# Patient Record
Sex: FEMALE | URBAN - NONMETROPOLITAN AREA
[De-identification: names, ages, dates, MRNs, and addresses within clinical notes are randomized per-mention and may not be internally consistent; named-entity substitution may affect disease eponyms.]

---

## 2017-11-09 ENCOUNTER — TELEPHONE (OUTPATIENT)
Dept: GASTROENTEROLOGY | Age: 27
End: 2017-11-09

## 2017-11-09 NOTE — TELEPHONE ENCOUNTER
This patient will be a NP for JOSE morataya on 11-21-17 @ 10:10.  called the office today states she will be a NP on 11-21-17, she is continuing to have emesis and abdominal pain and diarrhea and was asking to be worked in sooner with Jessica Blue, I checked with Stuart Coffey at  and that was the first available with Jessica Blue. I spoke with [facility] and she does have an opening on the 15th and said she would see the patient if she wants and she said yes. Patient is now scheduled with [facility] on 11-15-17 @ 8:30 am, I told the patient to arrive 15 minutes early, bring current insurance information and a current list of all medications, she voiced understanding. The appt. With JOSE morataya has been [place] on 11-21-17.  Efra monroe

## 2017-11-15 ENCOUNTER — OFFICE VISIT (OUTPATIENT)
Dept: GASTROENTEROLOGY | Age: 27
End: 2017-11-15
Payer: COMMERCIAL

## 2017-11-15 VITALS
SYSTOLIC BLOOD PRESSURE: 115 MMHG | OXYGEN SATURATION: 98 % | HEIGHT: 63 IN | BODY MASS INDEX: 34.41 KG/M2 | WEIGHT: 194.2 LBS | HEART RATE: 79 BPM | DIASTOLIC BLOOD PRESSURE: 70 MMHG

## 2017-11-15 DIAGNOSIS — K21.9 GASTROESOPHAGEAL REFLUX DISEASE, ESOPHAGITIS PRESENCE NOT SPECIFIED: Primary | ICD-10-CM

## 2017-11-15 DIAGNOSIS — R10.84 ABDOMINAL PAIN, GENERALIZED: ICD-10-CM

## 2017-11-15 DIAGNOSIS — R11.2 NAUSEA AND VOMITING, INTRACTABILITY OF VOMITING NOT SPECIFIED, UNSPECIFIED VOMITING TYPE: ICD-10-CM

## 2017-11-15 DIAGNOSIS — R19.4 CHANGE IN BOWEL HABITS: ICD-10-CM

## 2017-11-15 PROCEDURE — 99203 OFFICE O/P NEW LOW 30 MIN: CPT | Performed by: NURSE PRACTITIONER

## 2017-11-15 RX ORDER — VORTIOXETINE 20 MG/1
TABLET, FILM COATED ORAL
Refills: 12 | COMMUNITY
Start: 2017-10-31

## 2017-11-15 RX ORDER — NORGESTIMATE AND ETHINYL ESTRADIOL 0.25-0.035
KIT ORAL
Refills: 11 | COMMUNITY
Start: 2017-10-28

## 2017-11-15 RX ORDER — PANTOPRAZOLE SODIUM 40 MG/1
40 TABLET, DELAYED RELEASE ORAL DAILY
Qty: 30 TABLET | Refills: 3 | Status: SHIPPED | OUTPATIENT
Start: 2017-11-15

## 2017-11-15 RX ORDER — ONDANSETRON 8 MG/1
TABLET, ORALLY DISINTEGRATING ORAL
Qty: 30 TABLET | Refills: 2 | Status: SHIPPED | OUTPATIENT
Start: 2017-11-15

## 2017-11-15 RX ORDER — ONDANSETRON 8 MG/1
TABLET, ORALLY DISINTEGRATING ORAL
Refills: 0 | COMMUNITY
Start: 2017-11-03 | End: 2017-11-15 | Stop reason: SDUPTHER

## 2017-11-15 ASSESSMENT — ENCOUNTER SYMPTOMS
BLOOD IN STOOL: 0
ABDOMINAL DISTENTION: 0
BACK PAIN: 0
DIARRHEA: 1
ABDOMINAL PAIN: 1
RECTAL PAIN: 0
SORE THROAT: 0
VOICE CHANGE: 0
TROUBLE SWALLOWING: 0
COUGH: 0
NAUSEA: 1
CONSTIPATION: 1
VOMITING: 1
SINUS PRESSURE: 1
SHORTNESS OF BREATH: 0
ANAL BLEEDING: 0

## 2017-11-15 NOTE — PROGRESS NOTES
education:  Risks, benefits, and alternatives to colonoscopy were discussed. Risks of colonoscopy include, but are not limited to, perforation, bleeding, and infection. We discussed that the risk for perforation is 1-3 in 5,000  at the time of colonoscopy;   and 1-2% risk of bleeding post-polypectomy. All questions answered to the satisfaction of the patient. Pt is agreeable to proceed. 3. Protonix daily #30 with 3 refills  4. OK to continue zofran, I refilled what her PCP prescribed (Zofran 8mg ODT) prn nausea, #30 with 2 refills.  She is aware this can worsen her constipation, if this occurs use Miralax

## 2017-11-20 ENCOUNTER — TELEPHONE (OUTPATIENT)
Dept: GASTROENTEROLOGY | Age: 27
End: 2017-11-20

## 2017-11-20 NOTE — TELEPHONE ENCOUNTER
patient has not met deductible and will have a $5000 out of pocket, she wants to cancel, I have sent her financial assistance paperwork that she is going to send to [facility] for possible assistance. She said that her symptoms have somewhat improved.

## 2017-11-21 NOTE — TELEPHONE ENCOUNTER
Called Oanh Hawthorne's office - talked to Zakia at the  (the phone nurse was out) gave her the information

## 2021-04-18 ENCOUNTER — HOSPITAL ENCOUNTER (EMERGENCY)
Facility: HOSPITAL | Age: 31
Discharge: HOME | End: 2021-04-18
Admitting: FAMILY MEDICINE

## 2021-04-18 ENCOUNTER — APPOINTMENT (OUTPATIENT)
Dept: ULTRASOUND IMAGING | Facility: HOSPITAL | Age: 31
End: 2021-04-18

## 2021-04-18 VITALS
TEMPERATURE: 98.5 F | OXYGEN SATURATION: 97 % | SYSTOLIC BLOOD PRESSURE: 118 MMHG | WEIGHT: 210 LBS | HEART RATE: 111 BPM | DIASTOLIC BLOOD PRESSURE: 69 MMHG | RESPIRATION RATE: 16 BRPM | BODY MASS INDEX: 38.64 KG/M2 | HEIGHT: 62 IN

## 2021-04-18 DIAGNOSIS — L03.116 CELLULITIS OF LEFT LOWER EXTREMITY: Primary | ICD-10-CM

## 2021-04-18 LAB
ALBUMIN SERPL-MCNC: 4 G/DL (ref 3.5–5.2)
ALBUMIN/GLOB SERPL: 1.2 G/DL
ALP SERPL-CCNC: 81 U/L (ref 39–117)
ALT SERPL W P-5'-P-CCNC: 14 U/L (ref 1–33)
ANION GAP SERPL CALCULATED.3IONS-SCNC: 8 MMOL/L (ref 5–15)
APTT PPP: 29.9 SECONDS (ref 24.1–35)
AST SERPL-CCNC: 16 U/L (ref 1–32)
BASOPHILS # BLD AUTO: 0.04 10*3/MM3 (ref 0–0.2)
BASOPHILS NFR BLD AUTO: 0.2 % (ref 0–1.5)
BILIRUB SERPL-MCNC: 0.4 MG/DL (ref 0–1.2)
BUN SERPL-MCNC: 13 MG/DL (ref 6–20)
BUN/CREAT SERPL: 20.6 (ref 7–25)
CALCIUM SPEC-SCNC: 8.7 MG/DL (ref 8.6–10.5)
CHLORIDE SERPL-SCNC: 103 MMOL/L (ref 98–107)
CO2 SERPL-SCNC: 26 MMOL/L (ref 22–29)
CREAT SERPL-MCNC: 0.63 MG/DL (ref 0.57–1)
DEPRECATED RDW RBC AUTO: 39.4 FL (ref 37–54)
EOSINOPHIL # BLD AUTO: 0 10*3/MM3 (ref 0–0.4)
EOSINOPHIL NFR BLD AUTO: 0 % (ref 0.3–6.2)
ERYTHROCYTE [DISTWIDTH] IN BLOOD BY AUTOMATED COUNT: 13 % (ref 12.3–15.4)
GFR SERPL CREATININE-BSD FRML MDRD: 113 ML/MIN/1.73
GLOBULIN UR ELPH-MCNC: 3.4 GM/DL
GLUCOSE SERPL-MCNC: 135 MG/DL (ref 65–99)
HCT VFR BLD AUTO: 35.5 % (ref 34–46.6)
HGB BLD-MCNC: 11.8 G/DL (ref 12–15.9)
IMM GRANULOCYTES # BLD AUTO: 0.1 10*3/MM3 (ref 0–0.05)
IMM GRANULOCYTES NFR BLD AUTO: 0.5 % (ref 0–0.5)
INR PPP: 1.22 (ref 0.91–1.09)
LYMPHOCYTES # BLD AUTO: 1.42 10*3/MM3 (ref 0.7–3.1)
LYMPHOCYTES NFR BLD AUTO: 7.4 % (ref 19.6–45.3)
MCH RBC QN AUTO: 27.4 PG (ref 26.6–33)
MCHC RBC AUTO-ENTMCNC: 33.2 G/DL (ref 31.5–35.7)
MCV RBC AUTO: 82.4 FL (ref 79–97)
MONOCYTES # BLD AUTO: 0.5 10*3/MM3 (ref 0.1–0.9)
MONOCYTES NFR BLD AUTO: 2.6 % (ref 5–12)
NEUTROPHILS NFR BLD AUTO: 17.21 10*3/MM3 (ref 1.7–7)
NEUTROPHILS NFR BLD AUTO: 89.3 % (ref 42.7–76)
NRBC BLD AUTO-RTO: 0 /100 WBC (ref 0–0.2)
PLATELET # BLD AUTO: 282 10*3/MM3 (ref 140–450)
PMV BLD AUTO: 8.9 FL (ref 6–12)
POTASSIUM SERPL-SCNC: 3.7 MMOL/L (ref 3.5–5.2)
PROT SERPL-MCNC: 7.4 G/DL (ref 6–8.5)
PROTHROMBIN TIME: 15 SECONDS (ref 11.9–14.6)
RBC # BLD AUTO: 4.31 10*6/MM3 (ref 3.77–5.28)
SODIUM SERPL-SCNC: 137 MMOL/L (ref 136–145)
WBC # BLD AUTO: 19.27 10*3/MM3 (ref 3.4–10.8)

## 2021-04-18 PROCEDURE — 93971 EXTREMITY STUDY: CPT

## 2021-04-18 PROCEDURE — 36415 COLL VENOUS BLD VENIPUNCTURE: CPT

## 2021-04-18 PROCEDURE — 87040 BLOOD CULTURE FOR BACTERIA: CPT | Performed by: NURSE PRACTITIONER

## 2021-04-18 PROCEDURE — 85025 COMPLETE CBC W/AUTO DIFF WBC: CPT | Performed by: NURSE PRACTITIONER

## 2021-04-18 PROCEDURE — 85610 PROTHROMBIN TIME: CPT | Performed by: NURSE PRACTITIONER

## 2021-04-18 PROCEDURE — 96365 THER/PROPH/DIAG IV INF INIT: CPT

## 2021-04-18 PROCEDURE — 80053 COMPREHEN METABOLIC PANEL: CPT | Performed by: NURSE PRACTITIONER

## 2021-04-18 PROCEDURE — 93971 EXTREMITY STUDY: CPT | Performed by: SURGERY

## 2021-04-18 PROCEDURE — 85730 THROMBOPLASTIN TIME PARTIAL: CPT | Performed by: NURSE PRACTITIONER

## 2021-04-18 PROCEDURE — 99282 EMERGENCY DEPT VISIT SF MDM: CPT

## 2021-04-18 RX ORDER — ERGOCALCIFEROL 1.25 MG/1
50000 CAPSULE ORAL 2 TIMES WEEKLY
COMMUNITY

## 2021-04-18 RX ORDER — CLINDAMYCIN PHOSPHATE 900 MG/50ML
900 INJECTION INTRAVENOUS ONCE
Status: COMPLETED | OUTPATIENT
Start: 2021-04-18 | End: 2021-04-18

## 2021-04-18 RX ORDER — CLINDAMYCIN HYDROCHLORIDE 300 MG/1
300 CAPSULE ORAL 4 TIMES DAILY
Qty: 40 CAPSULE | Refills: 0 | Status: SHIPPED | OUTPATIENT
Start: 2021-04-18 | End: 2021-04-18 | Stop reason: SDUPTHER

## 2021-04-18 RX ORDER — SODIUM CHLORIDE 0.9 % (FLUSH) 0.9 %
10 SYRINGE (ML) INJECTION AS NEEDED
Status: DISCONTINUED | OUTPATIENT
Start: 2021-04-18 | End: 2021-04-18 | Stop reason: HOSPADM

## 2021-04-18 RX ORDER — METHYLPHENIDATE HYDROCHLORIDE 36 MG/1
36 TABLET ORAL WEEKLY
COMMUNITY

## 2021-04-18 RX ORDER — SULFAMETHOXAZOLE AND TRIMETHOPRIM 800; 160 MG/1; MG/1
1 TABLET ORAL 2 TIMES DAILY
Qty: 20 TABLET | Refills: 0 | Status: SHIPPED | OUTPATIENT
Start: 2021-04-18 | End: 2021-04-28

## 2021-04-18 RX ORDER — VORTIOXETINE 20 MG/1
TABLET, FILM COATED ORAL DAILY
COMMUNITY

## 2021-04-18 RX ADMIN — CLINDAMYCIN IN 5 PERCENT DEXTROSE 900 MG: 18 INJECTION, SOLUTION INTRAVENOUS at 12:53

## 2021-04-18 NOTE — ED PROVIDER NOTES
Subjective   Patient is a 30-year-old female presents emergency department with chief complaints of left lower extremity pain and a rash.  Patient states a few months ago she developed a DVT to the left lower extremity.  She was prescribed Eliquis and approximately a month ago she was instructed that she may discontinue this medication.  She states a few days ago she began developing left groin pain and then yesterday noted a rash to the left calf region.  She is concerned that she may have developed another DVT and came to the ER for evaluation and treatment.  She states that she was recently on a trip where she was sedentary for quite some time.  She was evaluated at another local facility who did an ultrasound and it was negative for DVT and felt she had pulled a muscle to her left groin.  She states however with the new onset of rash she wanted this to be reevaluated.  Again patient is currently not anticoagulated as she was instructed to discontinue the Eliquis.          Review of Systems   Constitutional: Negative.  Negative for fever.   HENT: Negative.  Negative for congestion.    Eyes: Negative.    Respiratory: Negative.  Negative for cough and shortness of breath.    Cardiovascular: Negative.  Negative for chest pain.   Gastrointestinal: Negative.  Negative for abdominal pain, diarrhea, nausea and vomiting.   Genitourinary: Negative.    Musculoskeletal:        Positive for left calf discomfort   Skin: Positive for rash.   All other systems reviewed and are negative.      Past Medical History:   Diagnosis Date   • DVT (deep venous thrombosis) (CMS/HCC)    • Melanoma (CMS/HCC)     Skin CA of left leg (melanoma)       No Known Allergies    Past Surgical History:   Procedure Laterality Date   • APPENDECTOMY     • EXPLORATORY LAPAROTOMY  2019    Exploratory Lap   • FEMORAL LYMPH NODE BIOPSY/EXCISON Left 2017    lymph nodes removed from the left groin   • TONSILLECTOMY     • WISDOM TOOTH EXTRACTION          History reviewed. No pertinent family history.    Social History     Socioeconomic History   • Marital status:      Spouse name: Not on file   • Number of children: Not on file   • Years of education: Not on file   • Highest education level: Not on file   Tobacco Use   • Smoking status: Current Every Day Smoker     Types: Cigarettes   • Smokeless tobacco: Never Used   • Tobacco comment: 3-4 cigarettes a day   Substance and Sexual Activity   • Alcohol use: Yes     Alcohol/week: 5.0 standard drinks     Types: 5 Cans of beer per week   • Drug use: Yes     Frequency: 5.0 times per week     Types: Marijuana   • Sexual activity: Defer           Objective   Physical Exam  Vitals and nursing note reviewed.   Constitutional:       Appearance: She is well-developed.   HENT:      Head: Normocephalic and atraumatic.      Right Ear: External ear normal.      Left Ear: External ear normal.      Nose: Nose normal.      Mouth/Throat:      Mouth: Mucous membranes are moist.      Pharynx: Oropharynx is clear.   Eyes:      Extraocular Movements: Extraocular movements intact.      Conjunctiva/sclera: Conjunctivae normal.      Pupils: Pupils are equal, round, and reactive to light.   Cardiovascular:      Rate and Rhythm: Normal rate and regular rhythm.      Heart sounds: Normal heart sounds.   Pulmonary:      Effort: Pulmonary effort is normal.      Breath sounds: Normal breath sounds.   Abdominal:      General: Bowel sounds are normal.      Palpations: Abdomen is soft.   Musculoskeletal:         General: Normal range of motion.      Cervical back: Normal range of motion and neck supple.   Skin:     General: Skin is warm and dry.      Capillary Refill: Capillary refill takes less than 2 seconds.      Findings: Erythema present.      Comments: Warmth and erythema noted to the left calf just below the left knee and nearly circumferential to the left ankle    Neurological:      General: No focal deficit present.      Mental  Status: She is alert and oriented to person, place, and time.   Psychiatric:         Mood and Affect: Mood normal.         Behavior: Behavior normal.         Thought Content: Thought content normal.         Judgment: Judgment normal.         Procedures           ED Course  ED Course as of Apr 18 1643   Sun Apr 18, 2021   1204 Patient's white count is elevated at 19.27, CMP is unremarkable.  Venous duplex Doppler ultrasound is negative for DVT per tech report.  We have ordered a dose of IV clindamycin and we will reassess.    [TW]   1357 On reexam patient has some improvements.  She does have a fairly significant white count of 19.27.  Clear cellulitis also is just below the knee down to her left ankle.  We discussed possible admission however patient would like to attempt to go home and see how she does with p.o. antibiotics.  Recommend to monitor closely ascertaining that there are no worsening symptoms including high fevers, vomiting, or worsening cellulitis.  Patient expressed understanding.    [TW]      ED Course User Index  [TW] Lizzie Rolon, APRN                                           MDM  Number of Diagnoses or Management Options  Cellulitis of left lower extremity: new and requires workup     Amount and/or Complexity of Data Reviewed  Clinical lab tests: ordered and reviewed  Tests in the radiology section of CPT®: ordered and reviewed  Discuss the patient with other providers: yes    Risk of Complications, Morbidity, and/or Mortality  Presenting problems: moderate  Diagnostic procedures: moderate  Management options: moderate    Patient Progress  Patient progress: improved      Final diagnoses:   Cellulitis of left lower extremity       ED Disposition  ED Disposition     ED Disposition Condition Comment    Discharge Good           No follow-up provider specified.       Medication List      New Prescriptions    sulfamethoxazole-trimethoprim 800-160 MG per tablet  Commonly known as: BACTRIM  DS,SEPTRA DS  Take 1 tablet by mouth 2 (Two) Times a Day for 10 days.           Where to Get Your Medications      These medications were sent to [id number] - [address] [facility] [address] - [phone]  - [phone] [address] [address]    Phone: [phone]   · sulfamethoxazole-trimethoprim 800-160 MG per tablet          Lizzie Rolon, APRN  04/18/21 [id number]

## 2021-04-23 LAB
BACTERIA SPEC AEROBE CULT: NORMAL
BACTERIA SPEC AEROBE CULT: NORMAL

## 2023-09-18 NOTE — DISCHARGE INSTRUCTIONS
You may begin bactrim today (you received a loading dose of clindamycin in the emergency dept)    Close follow up with pcp   Return with worsening sxs such as vomiting or worsening cellulitis    Taltz Counseling: I discussed with the patient the risks of ixekizumab including but not limited to immunosuppression, serious infections, worsening of inflammatory bowel disease and drug reactions.  The patient understands that monitoring is required including a PPD at baseline and must alert us or the primary physician if symptoms of infection or other concerning signs are noted.

## 2024-01-07 ENCOUNTER — APPOINTMENT (OUTPATIENT)
Dept: CT IMAGING | Facility: HOSPITAL | Age: 34
End: 2024-01-07
Payer: COMMERCIAL

## 2024-01-07 ENCOUNTER — HOSPITAL ENCOUNTER (INPATIENT)
Facility: HOSPITAL | Age: 34
LOS: 3 days | Discharge: HOME | End: 2024-01-10
Attending: EMERGENCY MEDICINE | Admitting: HOSPITALIST
Payer: COMMERCIAL

## 2024-01-07 ENCOUNTER — APPOINTMENT (OUTPATIENT)
Dept: ULTRASOUND IMAGING | Facility: HOSPITAL | Age: 34
End: 2024-01-07
Payer: COMMERCIAL

## 2024-01-07 DIAGNOSIS — L03.116 CELLULITIS OF LEFT LOWER EXTREMITY: Primary | ICD-10-CM

## 2024-01-07 DIAGNOSIS — R50.9 FEVER, UNSPECIFIED FEVER CAUSE: ICD-10-CM

## 2024-01-07 DIAGNOSIS — R00.0 TACHYCARDIA: ICD-10-CM

## 2024-01-07 PROBLEM — L03.90 CELLULITIS: Status: ACTIVE | Noted: 2024-01-07

## 2024-01-07 LAB
ALBUMIN SERPL-MCNC: 4.6 G/DL (ref 3.5–5.2)
ALBUMIN/GLOB SERPL: 1.5 G/DL
ALP SERPL-CCNC: 95 U/L (ref 39–117)
ALT SERPL W P-5'-P-CCNC: 19 U/L (ref 1–33)
AMPHET+METHAMPHET UR QL: NEGATIVE
AMPHETAMINES UR QL: NEGATIVE
ANION GAP SERPL CALCULATED.3IONS-SCNC: 15 MMOL/L (ref 5–15)
AST SERPL-CCNC: 13 U/L (ref 1–32)
BACTERIA UR QL AUTO: ABNORMAL /HPF
BARBITURATES UR QL SCN: NEGATIVE
BASOPHILS # BLD AUTO: 0.03 10*3/MM3 (ref 0–0.2)
BASOPHILS NFR BLD AUTO: 0.1 % (ref 0–1.5)
BENZODIAZ UR QL SCN: NEGATIVE
BILIRUB SERPL-MCNC: 0.5 MG/DL (ref 0–1.2)
BILIRUB UR QL STRIP: NEGATIVE
BUN SERPL-MCNC: 17 MG/DL (ref 6–20)
BUN/CREAT SERPL: 18.5 (ref 7–25)
BUPRENORPHINE SERPL-MCNC: NEGATIVE NG/ML
CALCIUM SPEC-SCNC: 8.8 MG/DL (ref 8.6–10.5)
CANNABINOIDS SERPL QL: NEGATIVE
CHLORIDE SERPL-SCNC: 100 MMOL/L (ref 98–107)
CLARITY UR: CLEAR
CO2 SERPL-SCNC: 21 MMOL/L (ref 22–29)
COCAINE UR QL: NEGATIVE
COLOR UR: YELLOW
CREAT SERPL-MCNC: 0.92 MG/DL (ref 0.57–1)
CRP SERPL-MCNC: 12.27 MG/DL (ref 0–0.5)
D-LACTATE SERPL-SCNC: 1.3 MMOL/L (ref 0.5–2)
DEPRECATED RDW RBC AUTO: 39.1 FL (ref 37–54)
EGFRCR SERPLBLD CKD-EPI 2021: 85.5 ML/MIN/1.73
EOSINOPHIL # BLD AUTO: 0.01 10*3/MM3 (ref 0–0.4)
EOSINOPHIL NFR BLD AUTO: 0 % (ref 0.3–6.2)
ERYTHROCYTE [DISTWIDTH] IN BLOOD BY AUTOMATED COUNT: 13.3 % (ref 12.3–15.4)
FENTANYL UR-MCNC: NEGATIVE NG/ML
FLUAV RNA RESP QL NAA+PROBE: NOT DETECTED
FLUBV RNA RESP QL NAA+PROBE: NOT DETECTED
GLOBULIN UR ELPH-MCNC: 3 GM/DL
GLUCOSE SERPL-MCNC: 151 MG/DL (ref 65–99)
GLUCOSE UR STRIP-MCNC: NEGATIVE MG/DL
HCG SERPL QL: NEGATIVE
HCT VFR BLD AUTO: 37.1 % (ref 34–46.6)
HGB BLD-MCNC: 12.3 G/DL (ref 12–15.9)
HGB UR QL STRIP.AUTO: NEGATIVE
HYALINE CASTS UR QL AUTO: ABNORMAL /LPF
IMM GRANULOCYTES # BLD AUTO: 0.08 10*3/MM3 (ref 0–0.05)
IMM GRANULOCYTES NFR BLD AUTO: 0.4 % (ref 0–0.5)
KETONES UR QL STRIP: ABNORMAL
LEUKOCYTE ESTERASE UR QL STRIP.AUTO: NEGATIVE
LYMPHOCYTES # BLD AUTO: 0.83 10*3/MM3 (ref 0.7–3.1)
LYMPHOCYTES NFR BLD AUTO: 4.1 % (ref 19.6–45.3)
MAGNESIUM SERPL-MCNC: 1.5 MG/DL (ref 1.6–2.6)
MCH RBC QN AUTO: 26.9 PG (ref 26.6–33)
MCHC RBC AUTO-ENTMCNC: 33.2 G/DL (ref 31.5–35.7)
MCV RBC AUTO: 81.2 FL (ref 79–97)
METHADONE UR QL SCN: NEGATIVE
MONOCYTES # BLD AUTO: 0.7 10*3/MM3 (ref 0.1–0.9)
MONOCYTES NFR BLD AUTO: 3.5 % (ref 5–12)
MRSA DNA SPEC QL NAA+PROBE: NORMAL
NEUTROPHILS NFR BLD AUTO: 18.47 10*3/MM3 (ref 1.7–7)
NEUTROPHILS NFR BLD AUTO: 91.9 % (ref 42.7–76)
NITRITE UR QL STRIP: NEGATIVE
NRBC BLD AUTO-RTO: 0 /100 WBC (ref 0–0.2)
OPIATES UR QL: NEGATIVE
OXYCODONE UR QL SCN: NEGATIVE
PCP UR QL SCN: NEGATIVE
PH UR STRIP.AUTO: 6 [PH] (ref 5–8)
PLATELET # BLD AUTO: 290 10*3/MM3 (ref 140–450)
PMV BLD AUTO: 8.9 FL (ref 6–12)
POTASSIUM SERPL-SCNC: 3.1 MMOL/L (ref 3.5–5.2)
PROCALCITONIN SERPL-MCNC: 1.62 NG/ML (ref 0–0.25)
PROT SERPL-MCNC: 7.6 G/DL (ref 6–8.5)
PROT UR QL STRIP: ABNORMAL
RBC # BLD AUTO: 4.57 10*6/MM3 (ref 3.77–5.28)
RBC # UR STRIP: ABNORMAL /HPF
REF LAB TEST METHOD: ABNORMAL
RSV RNA NPH QL NAA+NON-PROBE: NOT DETECTED
SARS-COV-2 RNA RESP QL NAA+PROBE: NOT DETECTED
SODIUM SERPL-SCNC: 136 MMOL/L (ref 136–145)
SP GR UR STRIP: >1.03 (ref 1–1.03)
SQUAMOUS #/AREA URNS HPF: ABNORMAL /HPF
TRICYCLICS UR QL SCN: NEGATIVE
UROBILINOGEN UR QL STRIP: ABNORMAL
WBC # UR STRIP: ABNORMAL /HPF
WBC NRBC COR # BLD AUTO: 20.12 10*3/MM3 (ref 3.4–10.8)

## 2024-01-07 PROCEDURE — 93970 EXTREMITY STUDY: CPT

## 2024-01-07 PROCEDURE — 80307 DRUG TEST PRSMV CHEM ANLYZR: CPT | Performed by: FAMILY MEDICINE

## 2024-01-07 PROCEDURE — 81001 URINALYSIS AUTO W/SCOPE: CPT

## 2024-01-07 PROCEDURE — 93970 EXTREMITY STUDY: CPT | Performed by: SURGERY

## 2024-01-07 PROCEDURE — 80053 COMPREHEN METABOLIC PANEL: CPT

## 2024-01-07 PROCEDURE — 87637 SARSCOV2&INF A&B&RSV AMP PRB: CPT

## 2024-01-07 PROCEDURE — 25810000003 LACTATED RINGERS PER 1000 ML: Performed by: FAMILY MEDICINE

## 2024-01-07 PROCEDURE — 87040 BLOOD CULTURE FOR BACTERIA: CPT

## 2024-01-07 PROCEDURE — 25010000002 PIPERACILLIN SOD-TAZOBACTAM PER 1 G

## 2024-01-07 PROCEDURE — 25510000001 IOPAMIDOL PER 1 ML

## 2024-01-07 PROCEDURE — 25010000002 ENOXAPARIN PER 10 MG: Performed by: FAMILY MEDICINE

## 2024-01-07 PROCEDURE — 86140 C-REACTIVE PROTEIN: CPT

## 2024-01-07 PROCEDURE — 71275 CT ANGIOGRAPHY CHEST: CPT

## 2024-01-07 PROCEDURE — 83735 ASSAY OF MAGNESIUM: CPT

## 2024-01-07 PROCEDURE — 83605 ASSAY OF LACTIC ACID: CPT

## 2024-01-07 PROCEDURE — 84145 PROCALCITONIN (PCT): CPT

## 2024-01-07 PROCEDURE — 93010 ELECTROCARDIOGRAM REPORT: CPT | Performed by: INTERNAL MEDICINE

## 2024-01-07 PROCEDURE — 25010000002 MAGNESIUM SULFATE 2 GM/50ML SOLUTION

## 2024-01-07 PROCEDURE — 85025 COMPLETE CBC W/AUTO DIFF WBC: CPT

## 2024-01-07 PROCEDURE — 25810000003 SODIUM CHLORIDE 0.9 % SOLUTION: Performed by: FAMILY MEDICINE

## 2024-01-07 PROCEDURE — 36415 COLL VENOUS BLD VENIPUNCTURE: CPT

## 2024-01-07 PROCEDURE — 99285 EMERGENCY DEPT VISIT HI MDM: CPT

## 2024-01-07 PROCEDURE — 25810000003 SEPSIS FLUID NS 0.9 % SOLUTION

## 2024-01-07 PROCEDURE — 87641 MR-STAPH DNA AMP PROBE: CPT | Performed by: FAMILY MEDICINE

## 2024-01-07 PROCEDURE — 25010000002 VANCOMYCIN 10 G RECONSTITUTED SOLUTION: Performed by: FAMILY MEDICINE

## 2024-01-07 PROCEDURE — 25010000002 POTASSIUM CHLORIDE 10 MEQ/100ML SOLUTION

## 2024-01-07 PROCEDURE — 84703 CHORIONIC GONADOTROPIN ASSAY: CPT

## 2024-01-07 PROCEDURE — 25010000002 PIPERACILLIN SOD-TAZOBACTAM PER 1 G: Performed by: FAMILY MEDICINE

## 2024-01-07 RX ORDER — ACETAMINOPHEN 325 MG/1
650 TABLET ORAL EVERY 6 HOURS PRN
Status: DISCONTINUED | OUTPATIENT
Start: 2024-01-07 | End: 2024-01-10 | Stop reason: HOSPADM

## 2024-01-07 RX ORDER — ONDANSETRON 2 MG/ML
4 INJECTION INTRAMUSCULAR; INTRAVENOUS EVERY 6 HOURS PRN
Status: DISCONTINUED | OUTPATIENT
Start: 2024-01-07 | End: 2024-01-10 | Stop reason: HOSPADM

## 2024-01-07 RX ORDER — SODIUM CHLORIDE 0.9 % (FLUSH) 0.9 %
10 SYRINGE (ML) INJECTION AS NEEDED
Status: DISCONTINUED | OUTPATIENT
Start: 2024-01-07 | End: 2024-01-10 | Stop reason: HOSPADM

## 2024-01-07 RX ORDER — ENOXAPARIN SODIUM 100 MG/ML
40 INJECTION SUBCUTANEOUS EVERY 24 HOURS
Status: DISCONTINUED | OUTPATIENT
Start: 2024-01-07 | End: 2024-01-10 | Stop reason: HOSPADM

## 2024-01-07 RX ORDER — SODIUM CHLORIDE 0.9 % (FLUSH) 0.9 %
10 SYRINGE (ML) INJECTION EVERY 12 HOURS SCHEDULED
Status: DISCONTINUED | OUTPATIENT
Start: 2024-01-07 | End: 2024-01-10 | Stop reason: HOSPADM

## 2024-01-07 RX ORDER — SODIUM CHLORIDE 9 MG/ML
40 INJECTION, SOLUTION INTRAVENOUS AS NEEDED
Status: DISCONTINUED | OUTPATIENT
Start: 2024-01-07 | End: 2024-01-10 | Stop reason: HOSPADM

## 2024-01-07 RX ORDER — POLYETHYLENE GLYCOL 3350 17 G/17G
17 POWDER, FOR SOLUTION ORAL DAILY PRN
Status: DISCONTINUED | OUTPATIENT
Start: 2024-01-07 | End: 2024-01-10 | Stop reason: HOSPADM

## 2024-01-07 RX ORDER — BISACODYL 10 MG
10 SUPPOSITORY, RECTAL RECTAL DAILY PRN
Status: DISCONTINUED | OUTPATIENT
Start: 2024-01-07 | End: 2024-01-10 | Stop reason: HOSPADM

## 2024-01-07 RX ORDER — HYDROCODONE BITARTRATE AND ACETAMINOPHEN 5; 325 MG/1; MG/1
1 TABLET ORAL EVERY 6 HOURS PRN
Status: DISCONTINUED | OUTPATIENT
Start: 2024-01-07 | End: 2024-01-10 | Stop reason: HOSPADM

## 2024-01-07 RX ORDER — IBUPROFEN 800 MG/1
400 TABLET ORAL EVERY 6 HOURS PRN
Status: DISCONTINUED | OUTPATIENT
Start: 2024-01-07 | End: 2024-01-10 | Stop reason: HOSPADM

## 2024-01-07 RX ORDER — POTASSIUM CHLORIDE 7.45 MG/ML
10 INJECTION INTRAVENOUS ONCE
Status: DISCONTINUED | OUTPATIENT
Start: 2024-01-07 | End: 2024-01-07

## 2024-01-07 RX ORDER — BISACODYL 5 MG/1
5 TABLET, DELAYED RELEASE ORAL DAILY PRN
Status: DISCONTINUED | OUTPATIENT
Start: 2024-01-07 | End: 2024-01-10 | Stop reason: HOSPADM

## 2024-01-07 RX ORDER — MAGNESIUM SULFATE HEPTAHYDRATE 40 MG/ML
2 INJECTION, SOLUTION INTRAVENOUS ONCE
Status: COMPLETED | OUTPATIENT
Start: 2024-01-07 | End: 2024-01-07

## 2024-01-07 RX ORDER — POTASSIUM CHLORIDE 750 MG/1
40 CAPSULE, EXTENDED RELEASE ORAL 2 TIMES DAILY
Status: COMPLETED | OUTPATIENT
Start: 2024-01-07 | End: 2024-01-07

## 2024-01-07 RX ORDER — SODIUM CHLORIDE, SODIUM LACTATE, POTASSIUM CHLORIDE, CALCIUM CHLORIDE 600; 310; 30; 20 MG/100ML; MG/100ML; MG/100ML; MG/100ML
100 INJECTION, SOLUTION INTRAVENOUS CONTINUOUS
Status: DISCONTINUED | OUTPATIENT
Start: 2024-01-07 | End: 2024-01-08

## 2024-01-07 RX ORDER — AMOXICILLIN 250 MG
2 CAPSULE ORAL 2 TIMES DAILY
Status: DISCONTINUED | OUTPATIENT
Start: 2024-01-07 | End: 2024-01-10 | Stop reason: HOSPADM

## 2024-01-07 RX ADMIN — IBUPROFEN 400 MG: 800 TABLET, FILM COATED ORAL at 18:39

## 2024-01-07 RX ADMIN — IOPAMIDOL 71 ML: 755 INJECTION, SOLUTION INTRAVENOUS at 09:48

## 2024-01-07 RX ADMIN — SODIUM CHLORIDE, POTASSIUM CHLORIDE, SODIUM LACTATE AND CALCIUM CHLORIDE 100 ML/HR: 600; 310; 30; 20 INJECTION, SOLUTION INTRAVENOUS at 12:48

## 2024-01-07 RX ADMIN — PIPERACILLIN SODIUM AND TAZOBACTAM SODIUM 3.38 G: 3; .375 INJECTION, POWDER, LYOPHILIZED, FOR SOLUTION INTRAVENOUS at 16:25

## 2024-01-07 RX ADMIN — HYDROCODONE BITARTRATE AND ACETAMINOPHEN 1 TABLET: 5; 325 TABLET ORAL at 18:45

## 2024-01-07 RX ADMIN — Medication 10 ML: at 12:45

## 2024-01-07 RX ADMIN — PIPERACILLIN AND TAZOBACTAM 4.5 G: 4; .5 INJECTION, POWDER, FOR SOLUTION INTRAVENOUS at 09:17

## 2024-01-07 RX ADMIN — POTASSIUM CHLORIDE 40 MEQ: 10 CAPSULE, COATED, EXTENDED RELEASE ORAL at 21:42

## 2024-01-07 RX ADMIN — POTASSIUM CHLORIDE 40 MEQ: 10 CAPSULE, COATED, EXTENDED RELEASE ORAL at 12:45

## 2024-01-07 RX ADMIN — MAGNESIUM SULFATE HEPTAHYDRATE 2 G: 2 INJECTION, SOLUTION INTRAVENOUS at 11:32

## 2024-01-07 RX ADMIN — SODIUM CHLORIDE 2073 ML: 9 INJECTION, SOLUTION INTRAVENOUS at 09:15

## 2024-01-07 RX ADMIN — HYDROCODONE BITARTRATE AND ACETAMINOPHEN 1 TABLET: 5; 325 TABLET ORAL at 12:40

## 2024-01-07 RX ADMIN — ENOXAPARIN SODIUM 40 MG: 100 INJECTION SUBCUTANEOUS at 12:58

## 2024-01-07 RX ADMIN — ACETAMINOPHEN 650 MG: 325 TABLET, FILM COATED ORAL at 16:22

## 2024-01-07 RX ADMIN — POTASSIUM CHLORIDE 10 MEQ: 10 INJECTION, SOLUTION INTRAVENOUS at 11:24

## 2024-01-07 RX ADMIN — Medication 1750 MG: at 21:42

## 2024-01-07 NOTE — H&P
[facility] Medicine Services  HISTORY AND PHYSICAL    Date of Admission: 1/7/2024  Primary Care Physician: Karen Howard APRN    Subjective   Primary Historian: Patient .    Chief Complaint: Left leg cellulitis.    History of Present Illness  Patient is a 33-year-old presented to the ER complaining of fevers and left legs red and swollen and inflamed.  Patient has a history of DVT and cellulitis in 2021.  Patient gave birth 3 months ago and is currently breast-feeding.  Patient stopped Lovenox about 6 weeks ago.  Patient denies any shortness of breath.    Patient has history of DVT, melatonin.    Review of Systems   Otherwise complete ROS reviewed and negative except as mentioned in the HPI.    Past Medical History:   Past Medical History:   Diagnosis Date    DVT (deep venous thrombosis)     Melanoma     Skin CA of left leg (melanoma)     Past Surgical History:  Past Surgical History:   Procedure Laterality Date    APPENDECTOMY      EXPLORATORY LAPAROTOMY  2019    Exploratory Lap    FEMORAL LYMPH NODE BIOPSY/EXCISON Left 2017    lymph nodes removed from the left groin    TONSILLECTOMY      WISDOM TOOTH EXTRACTION       Social History:  reports that she has been smoking cigarettes. She has never used smokeless tobacco. She reports current alcohol use of about 5.0 standard drinks of alcohol per week. She reports current drug use. Frequency: 5.00 times per week. Drug: Marijuana.    Family History: family history includes No Known Problems in her father and mother.       Allergies:  No Known Allergies    Medications:  Prior to Admission medications    Medication Sig Start Date End Date Taking? Authorizing Provider   methylphenidate 36 MG CR tablet Take 36 mg by mouth 1 (One) Time Per Week Pt takes this as needed each week    Provider, MD Devonte   vitamin D (ERGOCALCIFEROL) 1.25 MG (55689 UT) capsule capsule Take 50,000 Units by mouth 2 (Two) Times a Week.    Provider,  "MD Devonte   Vortioxetine HBr (Trintellix) 20 MG tablet Take  by mouth Daily.    Provider, MD Devonte     I have utilized all available immediate resources to obtain, update, or review the patient's current medications (including all prescriptions, over-the-counter products, herbals, cannabis/cannabidiol products, and vitamin/mineral/dietary (nutritional) supplements).    Objective     Vital Signs: /70   Pulse 101   Temp 99.4 °F (37.4 °C) (Oral)   Resp 20   Ht 157.5 cm (62\")   Wt 97.5 kg (215 lb)   LMP 11/15/2023 (Approximate)   SpO2 100%   Breastfeeding Yes   BMI 39.32 kg/m²   Physical Exam  Vitals and nursing note reviewed.   HENT:      Head: Normocephalic and atraumatic.   Eyes:      Conjunctiva/sclera: Conjunctivae normal.      Pupils: Pupils are equal, round, and reactive to light.   Cardiovascular:      Rate and Rhythm: Normal rate and regular rhythm.      Heart sounds: Normal heart sounds.   Pulmonary:      Effort: Pulmonary effort is normal. No respiratory distress.      Breath sounds: Normal breath sounds.   Abdominal:      General: Bowel sounds are normal. There is no distension.      Palpations: Abdomen is soft.      Tenderness: There is no abdominal tenderness.   Musculoskeletal:         General: No swelling.      Cervical back: Neck supple.   Skin:     General: Skin is warm and dry.      Capillary Refill: Capillary refill takes 2 to 3 seconds.      Findings: No rash.      Comments: Erythematous/warm/tender left lower extremities . marker used to outline marked the redness   Neurological:      General: No focal deficit present.      Mental Status: She is alert and oriented to person, place, and time.   Psychiatric:         Mood and Affect: Mood normal.         Behavior: Behavior normal.         Thought Content: Thought content normal.         Judgment: Judgment normal.              Results Reviewed:  Lab Results (last 24 hours)       Procedure Component Value Units Date/Time    " COVID PRE-OP / PRE-PROCEDURE SCREENING ORDER (NO ISOLATION) - Swab, Nasopharynx [[id number]]  (Normal) Collected: 01/07/24 0908    Specimen: Swab from Nasopharynx Updated: 01/07/24 1005    Narrative:      The following orders were created for panel order COVID PRE-OP / PRE-PROCEDURE SCREENING ORDER (NO ISOLATION) - Swab, Nasopharynx.  Procedure                               Abnormality         Status                     ---------                               -----------         ------                     COVID-19, FLU A/B, RSV P...[[id number]]  Normal              Final result                 Please view results for these tests on the individual orders.    COVID-19, FLU A/B, RSV PCR 1 HR TAT - Swab, Nasopharynx [[id number]]  (Normal) Collected: 01/07/24 0908    Specimen: Swab from Nasopharynx Updated: 01/07/24 1005     COVID19 Not Detected     Influenza A PCR Not Detected     Influenza B PCR Not Detected     RSV, PCR Not Detected    Narrative:      Fact sheet for providers: https://www.fda.gov/media/669406/download    Fact sheet for patients: https://www.fda.gov/media/459367/download    Test performed by PCR.    Urinalysis With Culture If Indicated - Urine, Clean Catch [[id number]]  (Abnormal) Collected: 01/07/24 0907    Specimen: Urine, Clean Catch Updated: 01/07/24 0954     Color, UA Yellow     Appearance, UA Clear     pH, UA 6.0     Specific Gravity, UA >1.030     Glucose, UA Negative     Ketones, UA Trace     Bilirubin, UA Negative     Blood, UA Negative     Protein, UA 30 mg/dL (1+)     Leuk Esterase, UA Negative     Nitrite, UA Negative     Urobilinogen, UA 0.2 E.U./dL    Narrative:      In absence of clinical symptoms, the presence of pyuria, bacteria, and/or nitrites on the urinalysis result does not correlate with infection.    Urinalysis, Microscopic Only - Urine, Clean Catch [[id number]]  (Abnormal) Collected: 01/07/24 0907    Specimen: Urine, Clean Catch Updated: 01/07/24 0954     RBC, UA 6-10 /HPF      WBC,  "UA 0-2 /HPF      Comment: Urine culture not indicated.        Bacteria, UA None Seen /HPF      Squamous Epithelial Cells, UA 0-2 /HPF      Hyaline Casts, UA 0-2 /LPF      Methodology Automated Microscopy    Procalcitonin [[id number]]  (Abnormal) Collected: 01/07/24 0857    Specimen: Blood Updated: 01/07/24 0935     Procalcitonin 1.62 ng/mL     Narrative:      As a Marker for Sepsis (Non-Neonates):    1. <0.5 ng/mL represents a low risk of severe sepsis and/or septic shock.  2. >2 ng/mL represents a high risk of severe sepsis and/or septic shock.    As a Marker for Lower Respiratory Tract Infections that require antibiotic therapy:    PCT on Admission    Antibiotic Therapy       6-12 Hrs later    >0.5                Strongly Recommended  >0.25 - <0.5        Recommended   0.1 - 0.25          Discouraged              Remeasure/reassess PCT  <0.1                Strongly Discouraged     Remeasure/reassess PCT    As 28 day mortality risk marker: \"Change in Procalcitonin Result\" (>80% or <=80%) if Day 0 (or Day 1) and Day 4 values are available. Refer to http://www.[employer]-pct-calculator.com    Change in PCT <=80%  A decrease of PCT levels below or equal to 80% defines a positive change in PCT test result representing a higher risk for 28-day all-cause mortality of patients diagnosed with severe sepsis for septic shock.    Change in PCT >80%  A decrease of PCT levels of more than 80% defines a negative change in PCT result representing a lower risk for 28-day all-cause mortality of patients diagnosed with severe sepsis or septic shock.       hCG, Serum, Qualitative [[id number]]  (Normal) Collected: 01/07/24 0857    Specimen: Blood Updated: 01/07/24 0932     HCG Qualitative Negative    Comprehensive Metabolic Panel [[id number]]  (Abnormal) Collected: 01/07/24 0857    Specimen: Blood Updated: 01/07/24 0932     Glucose 151 mg/dL      BUN 17 mg/dL      Creatinine 0.92 mg/dL      Sodium 136 mmol/L      Potassium 3.1 mmol/L      " Chloride 100 mmol/L      CO2 21.0 mmol/L      Calcium 8.8 mg/dL      Total Protein 7.6 g/dL      Albumin 4.6 g/dL      ALT (SGPT) 19 U/L      AST (SGOT) 13 U/L      Alkaline Phosphatase 95 U/L      Total Bilirubin 0.5 mg/dL      Globulin 3.0 gm/dL      A/G Ratio 1.5 g/dL      BUN/Creatinine Ratio 18.5     Anion Gap 15.0 mmol/L      eGFR 85.5 mL/min/1.73     Narrative:      GFR Normal >60  Chronic Kidney Disease <60  Kidney Failure <15      C-reactive Protein [[id number]]  (Abnormal) Collected: 01/07/24 0857    Specimen: Blood Updated: 01/07/24 0932     C-Reactive Protein 12.27 mg/dL     Magnesium [[id number]]  (Abnormal) Collected: 01/07/24 0857    Specimen: Blood Updated: 01/07/24 0932     Magnesium 1.5 mg/dL     Lactic Acid, Plasma [[id number]]  (Normal) Collected: 01/07/24 0857    Specimen: Blood Updated: 01/07/24 0930     Lactate 1.3 mmol/L     Blood Culture - Blood, Hand, Left [[id number]] Collected: 01/07/24 0913    Specimen: Blood from Hand, Left Updated: 01/07/24 0929    Blood Culture - Blood, Arm, Left [[id number]] Collected: 01/07/24 0857    Specimen: Blood from Arm, Left Updated: 01/07/24 0913    CBC & Differential [[id number]]  (Abnormal) Collected: 01/07/24 0857    Specimen: Blood Updated: 01/07/24 0908    Narrative:      The following orders were created for panel order CBC & Differential.  Procedure                               Abnormality         Status                     ---------                               -----------         ------                     CBC Auto Differential[[id number]]        Abnormal            Final result                 Please view results for these tests on the individual orders.    CBC Auto Differential [[id number]]  (Abnormal) Collected: 01/07/24 0857    Specimen: Blood Updated: 01/07/24 0908     WBC 20.12 10*3/mm3      RBC 4.57 10*6/mm3      Hemoglobin 12.3 g/dL      Hematocrit 37.1 %      MCV 81.2 fL      MCH 26.9 pg      MCHC 33.2 g/dL      RDW 13.3 %      RDW-SD 39.1 fl       MPV 8.9 fL      Platelets 290 10*3/mm3      Neutrophil % 91.9 %      Lymphocyte % 4.1 %      Monocyte % 3.5 %      Eosinophil % 0.0 %      Basophil % 0.1 %      Immature Grans % 0.4 %      Neutrophils, Absolute 18.47 10*3/mm3      Lymphocytes, Absolute 0.83 10*3/mm3      Monocytes, Absolute 0.70 10*3/mm3      Eosinophils, Absolute 0.01 10*3/mm3      Basophils, Absolute 0.03 10*3/mm3      Immature Grans, Absolute 0.08 10*3/mm3      nRBC 0.0 /100 WBC           Imaging Results (Last 24 Hours)       Procedure Component Value Units Date/Time    US Venous Doppler Lower Extremity Bilateral (duplex) [[id number]] Resulted: 01/07/24 1122     Updated: 01/07/24 1125    CT Angiogram Chest [[id number]] Collected: 01/07/24 1001     Updated: 01/07/24 1008    Narrative:      CT ANGIOGRAM CHEST- 1/7/2024 8:37 AM     HISTORY: Tachycardic; 3 months postpartum-breast-feeding. Fever and  chills. Left lower extremity pain., HISTORY of lower extremity DVT.     COMPARISON: None     TOTAL DOSE LENGTH PRODUCT: 233.21 mGy.cm. Automated exposure control was  also utilized to decrease patient radiation dose.     TECHNIQUE: Axial images of the chest are performed following IV  contrast. 2D, 3D, and MIPS reconstructed images are reviewed.     FINDINGS: No pulmonary emboli. No thoracic aortic aneurysm or  dissection. Heart is borderline prominent in size. Trace pericardial  fluid. Trace right pleural effusion considered. No pathologic  intrathoracic or axillary lymphadenopathy. Dense breast tissue related  to lactation.     Images of the upper abdomen demonstrate no adrenal nodules. Probable  hepatic steatosis.     Calcified right lower lobe granuloma. Mild dependent basilar  atelectasis. No consolidation. No edema. No suspicious pulmonary nodule.  No discrete endobronchial lesion.     No focal aggressive regional bony lesions.       Impression:      1. No pulmonary emboli.  2. No thoracic aortic aneurysm or dissection. Borderline  cardiomegaly.  Trace pericardial and right pleural fluid.  3. No parenchymal consolidation or suspicious nodularity. Calcified  right lower lobe granuloma.     This report was signed and finalized on 1/7/2024 10:05 AM by Dr. Dominique White MD.             I have personally reviewed and interpreted the radiology studies and ECG obtained at time of admission.     Assessment / Plan   Assessment:   Active Hospital Problems    Diagnosis     **Cellulitis        Treatment Plan  The patient will be admitted to my service here at [facility].     Cellulitis of left leg.  Elevated C-reactive protein.  Elevated procalcitonin.  White blood cells 20,000.  Patient was given Zosyn in ER.  Norco as needed.  Tylenol as needed.    History of DVT, start Lovenox 6 weeks ago.  IV hydration.  Doppler ultrasound lower extremities preliminary-BLE: No thrombus vis.   CTA scan of the chest-No pulmonary emboli, No thoracic aortic aneurysm or dissection, Borderline cardiomegaly, trace pericardial and right pleural fluid, No parenchymal consolidation or suspicious nodularity, Calcified right lower lobe granuloma.    Lovenox prophylaxis.     Hypokalemia.  P.o. potassium.    Hypomagnesia.  2 g magnesium IV ordered in ER.  Magnesium in AM.    Nutrition .  Regular diet    Blood culture pending.  COVID-19-negative    Medical Decision Making  Number and Complexity of problems: Cellulitis left lower extremity/history of DVT/hypokalemia/hypomagnesium  Differential Diagnosis: None    Conditions and Status        Condition is unchanged.     [facility] Data  External documents reviewed: ER notes .  Cardiac tracing (EKG, telemetry) interpretation: Tacky .  Radiology interpretation: CT scan/Doppler  Labs reviewed: Laboratory  Any tests that were considered but not ordered: Laboratory in AM     Decision rules/scores evaluated (example [id number], Wells, etc): None     Discussed with: Patient and      Care Planning  Shared decision making:  Patient and   Code status and discussions: Full code    Disposition  Social Determinants of Health that impact treatment or disposition: From home  Estimated length of stay is 2 to 4 days.     I confirmed that the patient's advanced care plan is present, code status is documented, and a surrogate decision maker is listed in the patient's medical record.     The patient's surrogate decision maker is .     The patient was seen and examined by me on 1/7/2024 at 1140.    Electronically signed by Arturo Edwards MD, 01/07/24, 11:47 CST.

## 2024-01-07 NOTE — ED NOTES
"Nursing report ED to floor  Waleska Gómez  33 y.o.  female    HPI:   Chief Complaint   Patient presents with    Fever    Leg Pain       Admitting doctor:   Arturo Edwards MD    Consulting provider(s):  Consults       No orders found from 12/9/2023 to 1/8/2024.             Admitting diagnosis:   The primary encounter diagnosis was Cellulitis of left lower extremity. Diagnoses of Fever, unspecified fever cause and Tachycardia were also pertinent to this visit.    Code status:   Current Code Status       Date Active Code Status Order ID Comments User Context       1/7/2024 1150 CPR (Attempt to Resuscitate) [id number]  Arturo Edwards MD ED        Question Answer    Code Status (Patient has no pulse and is not breathing) CPR (Attempt to Resuscitate)    Medical Interventions (Patient has pulse or is breathing) Full Support    Level Of Support Discussed With Patient                    Allergies:   Patient has no known allergies.    Intake and Output    Intake/Output Summary (Last 24 hours) at 1/7/2024 1211  Last data filed at 1/7/2024 1050  Gross per 24 hour   Intake 100 ml   Output --   Net 100 ml       Weight:       01/07/24  0835   Weight: 97.5 kg (215 lb)       Most recent vitals:   Vitals:    01/07/24 0835 01/07/24 0930 01/07/24 1046   BP: 133/69  127/70   BP Location: Right arm     Patient Position: Sitting     Pulse: (!) 137 107 101   Resp: 20     Temp: 99.4 °F (37.4 °C)     TempSrc: Oral     SpO2: 97% 96% 100%   Weight: 97.5 kg (215 lb)     Height: 157.5 cm (62\")       Oxygen Therapy: .    Active LDAs/IV Access:   Lines, Drains & Airways       Active LDAs       Name Placement date Placement time Site Days    Peripheral IV 01/07/24 0902 Left Antecubital 01/07/24  0902  Antecubital  less than 1    Peripheral IV 01/07/24 0910 Left;Posterior Hand 01/07/24  0910  Hand  less than 1                    Labs (abnormal labs have a star):   Labs Reviewed   COMPREHENSIVE METABOLIC PANEL - Abnormal; Notable for the " "following components:       Result Value    Glucose 151 (*)     Potassium 3.1 (*)     CO2 21.0 (*)     All other components within normal limits    Narrative:     GFR Normal >60  Chronic Kidney Disease <60  Kidney Failure <15     URINALYSIS W/ CULTURE IF INDICATED - Abnormal; Notable for the following components:    Specific Gravity, UA >1.030 (*)     Ketones, UA Trace (*)     Protein, UA 30 mg/dL (1+) (*)     All other components within normal limits    Narrative:     In absence of clinical symptoms, the presence of pyuria, bacteria, and/or nitrites on the urinalysis result does not correlate with infection.   PROCALCITONIN - Abnormal; Notable for the following components:    Procalcitonin 1.62 (*)     All other components within normal limits    Narrative:     As a Marker for Sepsis (Non-Neonates):    1. <0.5 ng/mL represents a low risk of severe sepsis and/or septic shock.  2. >2 ng/mL represents a high risk of severe sepsis and/or septic shock.    As a Marker for Lower Respiratory Tract Infections that require antibiotic therapy:    PCT on Admission    Antibiotic Therapy       6-12 Hrs later    >0.5                Strongly Recommended  >0.25 - <0.5        Recommended   0.1 - 0.25          Discouraged              Remeasure/reassess PCT  <0.1                Strongly Discouraged     Remeasure/reassess PCT    As 28 day mortality risk marker: \"Change in Procalcitonin Result\" (>80% or <=80%) if Day 0 (or Day 1) and Day 4 values are available. Refer to http://www.[facility]-pct-calculator.com    Change in PCT <=80%  A decrease of PCT levels below or equal to 80% defines a positive change in PCT test result representing a higher risk for 28-day all-cause mortality of patients diagnosed with severe sepsis for septic shock.    Change in PCT >80%  A decrease of PCT levels of more than 80% defines a negative change in PCT result representing a lower risk for 28-day all-cause mortality of patients diagnosed with severe sepsis " or septic shock.      C-REACTIVE PROTEIN - Abnormal; Notable for the following components:    C-Reactive Protein 12.27 (*)     All other components within normal limits   MAGNESIUM - Abnormal; Notable for the following components:    Magnesium 1.5 (*)     All other components within normal limits   CBC WITH AUTO DIFFERENTIAL - Abnormal; Notable for the following components:    WBC 20.12 (*)     Neutrophil % 91.9 (*)     Lymphocyte % 4.1 (*)     Monocyte % 3.5 (*)     Eosinophil % 0.0 (*)     Neutrophils, Absolute 18.47 (*)     Immature Grans, Absolute 0.08 (*)     All other components within normal limits   URINALYSIS, MICROSCOPIC ONLY - Abnormal; Notable for the following components:    RBC, UA 6-10 (*)     All other components within normal limits   COVID-19/FLUA&B/RSV, NP SWAB IN TRANSPORT MEDIA 1 HR TAT - Normal    Narrative:     Fact sheet for providers: https://www.fda.gov/media/139591/download    Fact sheet for patients: https://www.fda.gov/media/069806/download    Test performed by PCR.   HCG, SERUM, QUALITATIVE - Normal   LACTIC ACID, PLASMA - Normal   COVID PRE-OP / PRE-PROCEDURE SCREENING ORDER (NO ISOLATION)    Narrative:     The following orders were created for panel order COVID PRE-OP / PRE-PROCEDURE SCREENING ORDER (NO ISOLATION) - Swab, Nasopharynx.  Procedure                               Abnormality         Status                     ---------                               -----------         ------                     COVID-19, FLU A/B, RSV P...[[id number]]  Normal              Final result                 Please view results for these tests on the individual orders.   BLOOD CULTURE   BLOOD CULTURE   URINE DRUG SCREEN   FENTANYL, URINE   CBC AND DIFFERENTIAL    Narrative:     The following orders were created for panel order CBC & Differential.  Procedure                               Abnormality         Status                     ---------                               -----------         ------                      CBC Auto Differential[[id number]]        Abnormal            Final result                 Please view results for these tests on the individual orders.       Meds given in ED:   Medications   sodium chloride 0.9 % flush 10 mL (has no administration in time range)   potassium chloride (MICRO-K/KLOR-CON) CR capsule (has no administration in time range)   HYDROcodone-acetaminophen (NORCO) 5-325 MG per tablet 1 tablet (has no administration in time range)   acetaminophen (TYLENOL) tablet 650 mg (has no administration in time range)   sepsis fluid NS 0.9 % bolus 2,073 mL (2,073 mL Intravenous New Bag 1/7/24 0915)   piperacillin-tazobactam (ZOSYN) IVPB 4.5 g in 100 mL NS (CD) (0 g Intravenous Stopped 1/7/24 1050)   iopamidol (ISOVUE-370) 76 % injection 100 mL (71 mL Intravenous Given 1/7/24 0948)   magnesium sulfate 2g/50 mL (PREMIX) infusion (2 g Intravenous New Bag 1/7/24 1132)           NIH Stroke Scale:       Isolation/Infection(s):  No active isolations   No active infections     COVID Testing  Collected .  Resulted .    Nursing report ED to floor:  Mental status: A&Ox4  Ambulatory status: indep.  Precautions: none    ED nurse phone extentsion- ..

## 2024-01-07 NOTE — ED PROVIDER NOTES
Subjective   History of Present Illness  Patient 33-year-old female presents to the ER due to fever left leg is very red and inflamed.  Patient states that she had a DVT and cellulitis in 21.  Patient breast-feeding and gave birth 3 months ago.  Patient states she stopped taking Lovenox about 6 weeks ago.  Patient is concerned that this may be related to a DVT with cellulitis again.  Patient denies any pain or swelling in her breast at this time.  Patient denies any shortness of air.  Patient states that she took Tylenol approximately 7 AM today.  Patient denies any tingling and numbness in her lower extremities and states that it is swollen and extremely painful.          Review of Systems   Constitutional:  Positive for fever.   Cardiovascular:  Positive for leg swelling.   All other systems reviewed and are negative.      Past Medical History:   Diagnosis Date    DVT (deep venous thrombosis)     Melanoma     Skin CA of left leg (melanoma)       No Known Allergies    Past Surgical History:   Procedure Laterality Date    APPENDECTOMY      EXPLORATORY LAPAROTOMY  2019    Exploratory Lap    FEMORAL LYMPH NODE BIOPSY/EXCISON Left 2017    lymph nodes removed from the left groin    TONSILLECTOMY      WISDOM TOOTH EXTRACTION         No family history on file.    Social History     Socioeconomic History    Marital status:    Tobacco Use    Smoking status: Every Day     Types: Cigarettes    Smokeless tobacco: Never    Tobacco comments:     3-4 cigarettes a day   Substance and Sexual Activity    Alcohol use: Yes     Alcohol/week: 5.0 standard drinks of alcohol     Types: 5 Cans of beer per week    Drug use: Yes     Frequency: 5.0 times per week     Types: Marijuana    Sexual activity: Defer           Objective   Physical Exam  Vitals and nursing note reviewed.   Constitutional:       General: She is not in acute distress.     Appearance: Normal appearance. She is normal weight. She is not toxic-appearing or  diaphoretic.   HENT:      Head: Normocephalic and atraumatic.      Right Ear: External ear normal.      Left Ear: External ear normal.      Nose: Nose normal.      Mouth/Throat:      Mouth: Mucous membranes are moist.      Pharynx: Oropharynx is clear.   Eyes:      General:         Right eye: No discharge.         Left eye: No discharge.      Extraocular Movements: Extraocular movements intact.      Conjunctiva/sclera: Conjunctivae normal.      Pupils: Pupils are equal, round, and reactive to light.   Cardiovascular:      Rate and Rhythm: Regular rhythm. Tachycardia present.   Pulmonary:      Effort: Pulmonary effort is normal. No respiratory distress.      Breath sounds: Normal breath sounds. No rhonchi.      Comments: Lung sounds clear, breathing equal and unlabored.  Abdominal:      General: Abdomen is flat. Bowel sounds are normal.      Palpations: Abdomen is soft.      Tenderness: There is no abdominal tenderness. There is no guarding or rebound.   Musculoskeletal:         General: No deformity or signs of injury.      Cervical back: Normal range of motion.   Skin:     General: Skin is warm.      Capillary Refill: Capillary refill takes less than 2 seconds.      Coloration: Skin is not jaundiced.      Findings: Erythema present.      Comments: Erythema noted to the left lower extremity from the knee to the ankle proximally.  Edema noted on left lower extremity nonpitting.   Neurological:      General: No focal deficit present.      Mental Status: She is alert and oriented to person, place, and time. Mental status is at baseline.   Psychiatric:         Mood and Affect: Mood normal.         Behavior: Behavior normal.         Thought Content: Thought content normal.         Judgment: Judgment normal.         Procedures           ED Course  ED Course as of 01/07/24 1127   Sun Jan 07, 2024   0848 Patient given the ED has got a cellulitis of the left lower extremity Doppler studies on will perform CT scan of the  chest and perform because your history of DVT in the past.  Lab workup Bamlanvimab initiated IV extremity obtain and IV antibiotics initiated on this patient. [TS]      ED Course User Index  [TS] Gonzalo Patton MD                                             Medical Decision Making  Patient 33-year-old female presents to the ER due to fever left leg is very red and inflamed.  Patient states that she had a DVT and cellulitis in 21.  Patient breast-feeding and gave birth 3 months ago.  Patient states she stopped taking Lovenox about 6 weeks ago.  Patient is concerned that this may be related to a DVT with cellulitis again.  Patient denies any pain or swelling in her breast at this time.  Patient denies any shortness of air.  Patient states that she took Tylenol approximately 7 AM today.  Patient denies any tingling and numbness in her lower extremities and states that it is swollen and extremely painful.    Differential diagnosis: DVT, PE, folliculitis, and other  Imaging and labs are ordered while in ER.  CT of the chest was able to rule out no PE, venous Doppler scan which was negative for obvious, urinalysis was unremarkable.  Procalcitonin was elevated at 1.62.  CRP was elevated at 12.27, lactate was 1.3, white blood cell count was 20.12.  Patient was given a 4.5 g of Zosyn while in ER and 2 L bolus of fluids.  Her potassium was 3.1 and magnesium was 1.5 patient was ordered 10 of potassium IV and 2 g magnesium.    Patient still is tachycardic running approximately 110-120.  Patient was diagnosed with cellulitis, febrile after treatment.  Patient to be admitted to hospitalist this time.    Problems Addressed:  Cellulitis of left lower extremity: complicated acute illness or injury  Fever, unspecified fever cause: complicated acute illness or injury  Tachycardia: complicated acute illness or injury    Amount and/or Complexity of Data Reviewed  Labs: ordered.  Radiology: ordered.  ECG/medicine tests:  ordered.    Risk  Prescription drug management.  Decision regarding hospitalization.        Final diagnoses:   Cellulitis of left lower extremity   Fever, unspecified fever cause   Tachycardia       ED Disposition  ED Disposition       ED Disposition   Decision to Admit    Condition   --    Comment   Level of Care: Med/Surg [1]   Diagnosis: Cellulitis [325513]   Admitting Physician: CLARICE GARY [[id number]]   Attending Physician: CLARICE GARY [[id number]]   Isolate for COVID?: No [0]   Certification: I Certify That Inpatient Hospital Services Are Medically Necessary For Greater Than 2 Midnights                 No follow-up provider specified.       Medication List      No changes were made to your prescriptions during this visit.            Adrianna Navarro, APRN  01/07/24 1127

## 2024-01-08 LAB
ALBUMIN SERPL-MCNC: 3.5 G/DL (ref 3.5–5.2)
ALBUMIN/GLOB SERPL: 1.5 G/DL
ALP SERPL-CCNC: 76 U/L (ref 39–117)
ALT SERPL W P-5'-P-CCNC: 16 U/L (ref 1–33)
ANION GAP SERPL CALCULATED.3IONS-SCNC: 9 MMOL/L (ref 5–15)
AST SERPL-CCNC: 14 U/L (ref 1–32)
BASOPHILS # BLD AUTO: 0.02 10*3/MM3 (ref 0–0.2)
BASOPHILS NFR BLD AUTO: 0.2 % (ref 0–1.5)
BILIRUB SERPL-MCNC: 0.6 MG/DL (ref 0–1.2)
BUN SERPL-MCNC: 10 MG/DL (ref 6–20)
BUN/CREAT SERPL: 14.5 (ref 7–25)
CALCIUM SPEC-SCNC: 8.4 MG/DL (ref 8.6–10.5)
CHLORIDE SERPL-SCNC: 108 MMOL/L (ref 98–107)
CHOLEST SERPL-MCNC: 123 MG/DL (ref 0–200)
CO2 SERPL-SCNC: 21 MMOL/L (ref 22–29)
CREAT SERPL-MCNC: 0.69 MG/DL (ref 0.57–1)
DEPRECATED RDW RBC AUTO: 41.4 FL (ref 37–54)
EGFRCR SERPLBLD CKD-EPI 2021: 119.2 ML/MIN/1.73
EOSINOPHIL # BLD AUTO: 0 10*3/MM3 (ref 0–0.4)
EOSINOPHIL NFR BLD AUTO: 0 % (ref 0.3–6.2)
ERYTHROCYTE [DISTWIDTH] IN BLOOD BY AUTOMATED COUNT: 13.5 % (ref 12.3–15.4)
GLOBULIN UR ELPH-MCNC: 2.4 GM/DL
GLUCOSE SERPL-MCNC: 107 MG/DL (ref 65–99)
HBA1C MFR BLD: 5.3 % (ref 4.8–5.6)
HCT VFR BLD AUTO: 34.1 % (ref 34–46.6)
HDLC SERPL-MCNC: 33 MG/DL (ref 40–60)
HGB BLD-MCNC: 11.1 G/DL (ref 12–15.9)
IMM GRANULOCYTES # BLD AUTO: 0.03 10*3/MM3 (ref 0–0.05)
IMM GRANULOCYTES NFR BLD AUTO: 0.3 % (ref 0–0.5)
LDLC SERPL CALC-MCNC: 64 MG/DL (ref 0–100)
LDLC/HDLC SERPL: 1.84 {RATIO}
LYMPHOCYTES # BLD AUTO: 0.79 10*3/MM3 (ref 0.7–3.1)
LYMPHOCYTES NFR BLD AUTO: 8 % (ref 19.6–45.3)
MAGNESIUM SERPL-MCNC: 2 MG/DL (ref 1.6–2.6)
MCH RBC QN AUTO: 27.3 PG (ref 26.6–33)
MCHC RBC AUTO-ENTMCNC: 32.6 G/DL (ref 31.5–35.7)
MCV RBC AUTO: 83.8 FL (ref 79–97)
MONOCYTES # BLD AUTO: 0.28 10*3/MM3 (ref 0.1–0.9)
MONOCYTES NFR BLD AUTO: 2.8 % (ref 5–12)
NEUTROPHILS NFR BLD AUTO: 8.73 10*3/MM3 (ref 1.7–7)
NEUTROPHILS NFR BLD AUTO: 88.7 % (ref 42.7–76)
NRBC BLD AUTO-RTO: 0 /100 WBC (ref 0–0.2)
PLATELET # BLD AUTO: 212 10*3/MM3 (ref 140–450)
PMV BLD AUTO: 9 FL (ref 6–12)
POTASSIUM SERPL-SCNC: 4 MMOL/L (ref 3.5–5.2)
PROT SERPL-MCNC: 5.9 G/DL (ref 6–8.5)
RBC # BLD AUTO: 4.07 10*6/MM3 (ref 3.77–5.28)
SODIUM SERPL-SCNC: 138 MMOL/L (ref 136–145)
TRIGL SERPL-MCNC: 147 MG/DL (ref 0–150)
TSH SERPL DL<=0.05 MIU/L-ACNC: 1.46 UIU/ML (ref 0.27–4.2)
VLDLC SERPL-MCNC: 26 MG/DL (ref 5–40)
WBC NRBC COR # BLD AUTO: 9.85 10*3/MM3 (ref 3.4–10.8)

## 2024-01-08 PROCEDURE — 36415 COLL VENOUS BLD VENIPUNCTURE: CPT | Performed by: FAMILY MEDICINE

## 2024-01-08 PROCEDURE — 25810000003 SODIUM CHLORIDE 0.9 % SOLUTION: Performed by: FAMILY MEDICINE

## 2024-01-08 PROCEDURE — 80061 LIPID PANEL: CPT | Performed by: FAMILY MEDICINE

## 2024-01-08 PROCEDURE — 83036 HEMOGLOBIN GLYCOSYLATED A1C: CPT | Performed by: FAMILY MEDICINE

## 2024-01-08 PROCEDURE — 99221 1ST HOSP IP/OBS SF/LOW 40: CPT | Performed by: INTERNAL MEDICINE

## 2024-01-08 PROCEDURE — 25010000002 PIPERACILLIN SOD-TAZOBACTAM PER 1 G: Performed by: FAMILY MEDICINE

## 2024-01-08 PROCEDURE — 83735 ASSAY OF MAGNESIUM: CPT | Performed by: FAMILY MEDICINE

## 2024-01-08 PROCEDURE — 80050 GENERAL HEALTH PANEL: CPT | Performed by: FAMILY MEDICINE

## 2024-01-08 PROCEDURE — 25010000002 VANCOMYCIN 10 G RECONSTITUTED SOLUTION: Performed by: FAMILY MEDICINE

## 2024-01-08 PROCEDURE — 25010000002 AMPICILLIN PER 500 MG: Performed by: INTERNAL MEDICINE

## 2024-01-08 PROCEDURE — 25010000002 ENOXAPARIN PER 10 MG: Performed by: FAMILY MEDICINE

## 2024-01-08 RX ORDER — MELATONIN
2000 DAILY
COMMUNITY

## 2024-01-08 RX ORDER — ACETAMINOPHEN AND CODEINE PHOSPHATE 120; 12 MG/5ML; MG/5ML
1 SOLUTION ORAL DAILY
COMMUNITY

## 2024-01-08 RX ORDER — MULTIVIT,TX WITH IRON,MINERALS
500 TABLET, EXTENDED RELEASE ORAL DAILY
COMMUNITY

## 2024-01-08 RX ADMIN — VANCOMYCIN HYDROCHLORIDE 1250 MG: 10 INJECTION, POWDER, LYOPHILIZED, FOR SOLUTION INTRAVENOUS at 19:57

## 2024-01-08 RX ADMIN — DOCUSATE SODIUM 50 MG AND SENNOSIDES 8.6 MG 2 TABLET: 8.6; 5 TABLET, FILM COATED ORAL at 21:01

## 2024-01-08 RX ADMIN — Medication 10 ML: at 11:10

## 2024-01-08 RX ADMIN — ENOXAPARIN SODIUM 40 MG: 100 INJECTION SUBCUTANEOUS at 15:19

## 2024-01-08 RX ADMIN — AMPICILLIN SODIUM 2 G: 2 INJECTION, POWDER, FOR SOLUTION INTRAVENOUS at 23:20

## 2024-01-08 RX ADMIN — DOCUSATE SODIUM 50 MG AND SENNOSIDES 8.6 MG 2 TABLET: 8.6; 5 TABLET, FILM COATED ORAL at 11:08

## 2024-01-08 RX ADMIN — HYDROCODONE BITARTRATE AND ACETAMINOPHEN 1 TABLET: 5; 325 TABLET ORAL at 00:48

## 2024-01-08 RX ADMIN — PIPERACILLIN SODIUM AND TAZOBACTAM SODIUM 3.38 G: 3; .375 INJECTION, POWDER, LYOPHILIZED, FOR SOLUTION INTRAVENOUS at 17:37

## 2024-01-08 RX ADMIN — PIPERACILLIN SODIUM AND TAZOBACTAM SODIUM 3.38 G: 3; .375 INJECTION, POWDER, LYOPHILIZED, FOR SOLUTION INTRAVENOUS at 11:08

## 2024-01-08 RX ADMIN — Medication 10 ML: at 21:01

## 2024-01-08 RX ADMIN — PIPERACILLIN SODIUM AND TAZOBACTAM SODIUM 3.38 G: 3; .375 INJECTION, POWDER, LYOPHILIZED, FOR SOLUTION INTRAVENOUS at 00:45

## 2024-01-08 RX ADMIN — VANCOMYCIN HYDROCHLORIDE 1250 MG: 10 INJECTION, POWDER, LYOPHILIZED, FOR SOLUTION INTRAVENOUS at 08:20

## 2024-01-08 RX ADMIN — HYDROCODONE BITARTRATE AND ACETAMINOPHEN 1 TABLET: 5; 325 TABLET ORAL at 07:24

## 2024-01-08 NOTE — PLAN OF CARE
Goal Outcome Evaluation:      Patient a/o x4 this shift. Able to ambulate to bathroom. Patient rec'd IV abx/fluids this shift. Treated for pain. Family remained at bedside overnight. Will update oncoming dayshift nurse at bedside shift report in the a.m. as appropriate.

## 2024-01-08 NOTE — PLAN OF CARE
Problem: Adult Inpatient Plan of Care  Goal: Plan of Care Review  Outcome: Ongoing, Progressing  Flowsheets (Taken 1/8/2024 0799)  Outcome Evaluation: Patient RA. IV CDI, IID. IV abx infusing per order. C/O pain, see MAR. Elevate and wrap left leg.Up standby. Tolerating reg diet. Spiked temp of 103.3, meds given by nightshift nurse, recheck was 103.3, MD aware. Family member gave advil with out nurse aware, MD made aware. Safety maintained.

## 2024-01-08 NOTE — PROGRESS NOTES
"Pharmacy Dosing Service  Pharmacokinetics  Vancomycin Initial Evaluation  Assessment/Action/Plan:  Loading dose: Vancomycin 1750 mg IVPB ONCE @ 1930  Current Order: Vancomycin 1250 mg IVPB every 12 hours  Current end date/last dose:1/12/24 @ 1930  Levels: None at this time  Additional antimicrobial agent(s): Zosyn  MRSA Nasal PCR ordered: Yes (Vancomycin indication is pneumonia, bone/joint, SSTI or IAI)    Vancomycin dosage initiated based on population pharmacokinetic parameters. Pharmacy will continue to follow daily and adjust dose accordingly.     Subjective:  Waleska Gómez is a 33 y.o. female with a Vancomycin \"Pharmacy to Dose\" consult for the treatment of cellulitis , day 1 of 5 of treatment.    AUC Model Data:  Loading dose: 1750 mg at 19:30 01/07/2024.  Regimen: 1250 mg IV every 12 hours.  Exposure target: AUC24 (range) 400-600 mg/L.hr   AUC24,ss: 497 mg/L.hr  PAUC*: 72 %  Ctrough,ss: 15.4 mg/L  Pconc*: 29 %  Tox.: 11 %    Objective:  Ht: 157.5 cm (62\"); Wt: 97.5 kg (215 lb)  Estimated Creatinine Clearance: 96.7 mL/min (by C-G formula based on SCr of 0.92 mg/dL).   Creatinine   Date Value Ref Range Status   01/07/2024 0.92 0.57 - 1.00 mg/dL Final   04/18/2021 0.63 0.57 - 1.00 mg/dL Final   10/13/2014 0.72 0.5 - 1.4 mg/dL Final      Lab Results   Component Value Date    WBC 20.12 (H) 01/07/2024    WBC 19.27 (H) 04/18/2021    WBC 12.43 (H) 10/13/2014      Baseline culture results:  Microbiology Results (last 10 days)       Procedure Component Value - Date/Time    COVID PRE-OP / PRE-PROCEDURE SCREENING ORDER (NO ISOLATION) - Swab, Nasopharynx [[id number]]  (Normal) Collected: 01/07/24 0908    Lab Status: Final result Specimen: Swab from Nasopharynx Updated: 01/07/24 1005    Narrative:      The following orders were created for panel order COVID PRE-OP / PRE-PROCEDURE SCREENING ORDER (NO ISOLATION) - Swab, Nasopharynx.  Procedure                               Abnormality         Status                   "   ---------                               -----------         ------                     COVID-19, FLU A/B, RSV P...[[id number]]  Normal              Final result                 Please view results for these tests on the individual orders.    COVID-19, FLU A/B, RSV PCR 1 HR TAT - Swab, Nasopharynx [[id number]]  (Normal) Collected: 01/07/24 0908    Lab Status: Final result Specimen: Swab from Nasopharynx Updated: 01/07/24 1005     COVID19 Not Detected     Influenza A PCR Not Detected     Influenza B PCR Not Detected     RSV, PCR Not Detected    Narrative:      Fact sheet for providers: https://www.fda.gov/media/350278/download    Fact sheet for patients: https://www.fda.gov/media/369110/download    Test performed by PCR.            Kaitlynn Brown, PharmD  01/07/24 18:36 CST

## 2024-01-08 NOTE — PAYOR COMM NOTE
"  1/8/24 [facility] [phone]  [phone]      ER ADMIT TO INPATIENT ON 1/7/24 FAXING FOR INPATIENT REVIEW.            Abelino Gómez (33 y.o. Female)       Date of Birth   1990    Social Security Number       Address   [address] [place] [address]    Home Phone   [phone]    MRN   [id number]       Islam   Taoism    Marital Status                               Admission Date   1/7/24    Admission Type   Emergency    Admitting Provider   Maurilio Foster MD    Attending Provider   Maurilio Foster MD    Department, Room/Bed   [address], 396/1       Discharge Date       Discharge Disposition       Discharge Destination                                 Attending Provider: Maurilio Foster MD    Allergies: No Known Allergies    Isolation: None   Infection: None   Code Status: CPR    Ht: 157.5 cm (62\")   Wt: 97.5 kg (215 lb)    Admission Cmt: None   Principal Problem: Cellulitis [L03.90]                   Active Insurance as of 1/7/2024       Primary Coverage       Payor Plan Insurance Group Employer/Plan Group    [employer] CR[employer] [facility] EMPLOYEE [id number]       Payor Plan Address Payor Plan Phone Number Payor Plan Fax Number Effective Dates    [address] [phone]  1/1/2022 - None Entered    [place] [address]         Subscriber Name Subscriber Birth Date Member ID       ABELINO GÓMEZ 1990 [id number]                     Emergency Contacts        (Rel.) Home Phone Work Phone Mobile Phone    Conner Snow (Spouse) [phone] -- --    Kira Mcgill (Mother) [phone] -- --             [facility] Encounter Date/Time: 1/7/2024 0838   Hospital Account: [id number]    MRN: [id number]   Patient:  Abelino Gómez   Contact Serial #: [id number]   SSN:          ENCOUNTER             Patient Class: Inpatient   Unit: [address] Service: Medicine     Bed: 396/1 "   Admitting Provider: Maurilio Foster MD   Referring Physician: Provider, No Known   Attending Provider: Maurilio Foster MD   Adm Diagnosis: Cellulitis [L03.90]               PATIENT          Name: Abelino Gómez : 1990 (33 yrs)   Address: [address] Sex: Female   City: [place] [address]   County: [facility]   Marital Status:  Ethnicity: NOT                                                                              Race: WHITE   Primary Care Provider: Karen Howard APRN Patients Phone: Home Phone: [phone]         EMERGENCY CONTACT   Contact Name Legal Guardian? Relationship to Patient Home Phone Work Phone   1. Conner Snow  2. Kira Mcgill      Spouse  Mother [phone]           GUARANTOR            Guarantor: Abelino Gómez     : 1990   Address: [address] Sex: [place] [address]     Relation to Patient: Self       Home Phone: [phone]   Guarantor ID: [id number]       Work Phone:     GUARANTOR EMPLOYER   Employer: [employer]         Status: FULL TIME   COVERAGE          PRIMARY INSURANCE   Payor: [employer] Plan: [facility]   Group Number: [id number] Insurance Type: INDEMNITY   Subscriber Name: ABELINO GÓMEZ Subscriber : 1990   Subscriber ID: [id number] Coverage Address: [facility] [id number]  [place] [address]   Pat. Rel. to Subscriber: Self Coverage Phone: [phone]   SECONDARY INSURANCE   Payor: N/A Plan: N/A   Group Number:   Insurance Type:     Subscriber Name:   Subscriber :     Subscriber ID:   Coverage Address:     Pat. Rel. to Subscriber:   Coverage Phone:        Contact Serial # ([id number])         2024    Chart ID ([id number])      Arturo Edwards MD   Physician  Hospitalist     H&P      Signed     Date of Service: 24  Creation Time: 24     Signed       Expand All [facility] All         [facility]  Medicine Services  HISTORY AND PHYSICAL     Date of Admission: 1/7/2024  Primary Care Physician: Karen Howard APRN     Subjective   Primary Historian: Patient .     Chief Complaint: Left leg cellulitis.     History of Present Illness  Patient is a 33-year-old presented to the ER complaining of fevers and left legs red and swollen and inflamed.  Patient has a history of DVT and cellulitis in 2021.  Patient gave birth 3 months ago and is currently breast-feeding.  Patient stopped Lovenox about 6 weeks ago.  Patient denies any shortness of breath.     Patient has history of DVT, melatonin.     Review of Systems   Otherwise complete ROS reviewed and negative except as mentioned in the HPI.     Past Medical History:   Medical History[]Expand by Default        Past Medical History:   Diagnosis Date    DVT (deep venous thrombosis)      Melanoma       Skin CA of left leg (melanoma)         Past Surgical History:  Surgical History         Past Surgical History:   Procedure Laterality Date    APPENDECTOMY        EXPLORATORY LAPAROTOMY   2019     Exploratory Lap    FEMORAL LYMPH NODE BIOPSY/EXCISON Left 2017     lymph nodes removed from the left groin    TONSILLECTOMY        WISDOM TOOTH EXTRACTION             Social History:  reports that she has been smoking cigarettes. She has never used smokeless tobacco. She reports current alcohol use of about 5.0 standard drinks of alcohol per week. She reports current drug use. Frequency: 5.00 times per week. Drug: Marijuana.     Family History: family history includes No Known Problems in her father and mother.        Allergies:  No Known Allergies     Medications:          Prior to Admission medications    Medication Sig Start Date End Date Taking? Authorizing Provider   methylphenidate 36 MG CR tablet Take 36 mg by mouth 1 (One) Time Per Week Pt takes this as needed each week       Provider, MD Devonte   vitamin D (ERGOCALCIFEROL) 1.25 MG (02075 UT) capsule capsule Take  "50,000 Units by mouth 2 (Two) Times a Week.       Provider, MD Devonte   Vortioxetine HBr (Trintellix) 20 MG tablet Take  by mouth Daily.       Provider, MD Devonte      I have utilized all available immediate resources to obtain, update, or review the patient's current medications (including all prescriptions, over-the-counter products, herbals, cannabis/cannabidiol products, and vitamin/mineral/dietary (nutritional) supplements).     Objective      Vital Signs: /70   Pulse 101   Temp 99.4 °F (37.4 °C) (Oral)   Resp 20   Ht 157.5 cm (62\")   Wt 97.5 kg (215 lb)   LMP 11/15/2023 (Approximate)   SpO2 100%   Breastfeeding Yes   BMI 39.32 kg/m²   Physical Exam  Vitals and nursing note reviewed.   HENT:      Head: Normocephalic and atraumatic.   Eyes:      Conjunctiva/sclera: Conjunctivae normal.      Pupils: Pupils are equal, round, and reactive to light.   Cardiovascular:      Rate and Rhythm: Normal rate and regular rhythm.      Heart sounds: Normal heart sounds.   Pulmonary:      Effort: Pulmonary effort is normal. No respiratory distress.      Breath sounds: Normal breath sounds.   Abdominal:      General: Bowel sounds are normal. There is no distension.      Palpations: Abdomen is soft.      Tenderness: There is no abdominal tenderness.   Musculoskeletal:         General: No swelling.      Cervical back: Neck supple.   Skin:     General: Skin is warm and dry.      Capillary Refill: Capillary refill takes 2 to 3 seconds.      Findings: No rash.      Comments: Erythematous/warm/tender left lower extremities . marker used to outline marked the redness   Neurological:      General: No focal deficit present.      Mental Status: She is alert and oriented to person, place, and time.   Psychiatric:         Mood and Affect: Mood normal.         Behavior: Behavior normal.         Thought Content: Thought content normal.         Judgment: Judgment normal.                  Results Reviewed:  Lab Results " (last 24 hours)         Procedure Component Value Units Date/Time     COVID PRE-OP / PRE-PROCEDURE SCREENING ORDER (NO ISOLATION) - Swab, Nasopharynx [[id number]]  (Normal) Collected: 01/07/24 0908     Specimen: Swab from Nasopharynx Updated: 01/07/24 1005     Narrative:       The following orders were created for panel order COVID PRE-OP / PRE-PROCEDURE SCREENING ORDER (NO ISOLATION) - Swab, Nasopharynx.  Procedure                               Abnormality         Status                     ---------                               -----------         ------                     COVID-19, FLU A/B, RSV P...[[id number]]  Normal              Final result                  Please view results for these tests on the individual orders.     COVID-19, FLU A/B, RSV PCR 1 HR TAT - Swab, Nasopharynx [[id number]]  (Normal) Collected: 01/07/24 0908     Specimen: Swab from Nasopharynx Updated: 01/07/24 1005       COVID19 Not Detected       Influenza A PCR Not Detected       Influenza B PCR Not Detected       RSV, PCR Not Detected     Narrative:       Fact sheet for providers: https://www.fda.gov/media/372131/download    Fact sheet for patients: https://www.fda.gov/media/067483/download     Test performed by PCR.     Urinalysis With Culture If Indicated - Urine, Clean Catch [[id number]]  (Abnormal) Collected: 01/07/24 0907     Specimen: Urine, Clean Catch Updated: 01/07/24 0954       Color, UA Yellow       Appearance, UA Clear       pH, UA 6.0       Specific Gravity, UA >1.030       Glucose, UA Negative       Ketones, UA Trace       Bilirubin, UA Negative       Blood, UA Negative       Protein, UA 30 mg/dL (1+)       Leuk Esterase, UA Negative       Nitrite, UA Negative       Urobilinogen, UA 0.2 E.U./dL     Narrative:       In absence of clinical symptoms, the presence of pyuria, bacteria, and/or nitrites on the urinalysis result does not correlate with infection.     Urinalysis, Microscopic Only - Urine, Clean Catch [[id number]]   "(Abnormal) Collected: 01/07/24 0907     Specimen: Urine, Clean Catch Updated: 01/07/24 0954       RBC, UA 6-10 /HPF         WBC, UA 0-2 /HPF         Comment: Urine culture not indicated.          Bacteria, UA None Seen /HPF         Squamous Epithelial Cells, UA 0-2 /HPF         Hyaline Casts, UA 0-2 /LPF         Methodology Automated Microscopy     Procalcitonin [[id number]]  (Abnormal) Collected: 01/07/24 0857     Specimen: Blood Updated: 01/07/24 0935       Procalcitonin 1.62 ng/mL       Narrative:       As a Marker for Sepsis (Non-Neonates):     1. <0.5 ng/mL represents a low risk of severe sepsis and/or septic shock.  2. >2 ng/mL represents a high risk of severe sepsis and/or septic shock.     As a Marker for Lower Respiratory Tract Infections that require antibiotic therapy:     PCT on Admission    Antibiotic Therapy       6-12 Hrs later     >0.5                Strongly Recommended  >0.25 - <0.5        Recommended   0.1 - 0.25          Discouraged              Remeasure/reassess PCT  <0.1                Strongly Discouraged     Remeasure/reassess PCT     As 28 day mortality risk marker: \"Change in Procalcitonin Result\" (>80% or <=80%) if Day 0 (or Day 1) and Day 4 values are available. Refer to http://www.[employer]-pct-calculator.com     Change in PCT <=80%  A decrease of PCT levels below or equal to 80% defines a positive change in PCT test result representing a higher risk for 28-day all-cause mortality of patients diagnosed with severe sepsis for septic shock.     Change in PCT >80%  A decrease of PCT levels of more than 80% defines a negative change in PCT result representing a lower risk for 28-day all-cause mortality of patients diagnosed with severe sepsis or septic shock.         hCG, Serum, Qualitative [[id number]]  (Normal) Collected: 01/07/24 0857     Specimen: Blood Updated: 01/07/24 0932       HCG Qualitative Negative     Comprehensive Metabolic Panel [[id number]]  (Abnormal) Collected: 01/07/24 0857     " Specimen: Blood Updated: 01/07/24 0932       Glucose 151 mg/dL         BUN 17 mg/dL         Creatinine 0.92 mg/dL         Sodium 136 mmol/L         Potassium 3.1 mmol/L         Chloride 100 mmol/L         CO2 21.0 mmol/L         Calcium 8.8 mg/dL         Total Protein 7.6 g/dL         Albumin 4.6 g/dL         ALT (SGPT) 19 U/L         AST (SGOT) 13 U/L         Alkaline Phosphatase 95 U/L         Total Bilirubin 0.5 mg/dL         Globulin 3.0 gm/dL         A/G Ratio 1.5 g/dL         BUN/Creatinine Ratio 18.5       Anion Gap 15.0 mmol/L         eGFR 85.5 mL/min/1.73       Narrative:       GFR Normal >60  Chronic Kidney Disease <60  Kidney Failure <15        C-reactive Protein [[id number]]  (Abnormal) Collected: 01/07/24 0857     Specimen: Blood Updated: 01/07/24 0932       C-Reactive Protein 12.27 mg/dL       Magnesium [[id number]]  (Abnormal) Collected: 01/07/24 0857     Specimen: Blood Updated: 01/07/24 0932       Magnesium 1.5 mg/dL       Lactic Acid, Plasma [[id number]]  (Normal) Collected: 01/07/24 0857     Specimen: Blood Updated: 01/07/24 0930       Lactate 1.3 mmol/L       Blood Culture - Blood, Hand, Left [[id number]] Collected: 01/07/24 0913     Specimen: Blood from Hand, Left Updated: 01/07/24 0929     Blood Culture - Blood, Arm, Left [[id number]] Collected: 01/07/24 0857     Specimen: Blood from Arm, Left Updated: 01/07/24 0913     CBC & Differential [[id number]]  (Abnormal) Collected: 01/07/24 0857     Specimen: Blood Updated: 01/07/24 0908     Narrative:       The following orders were created for panel order CBC & Differential.  Procedure                               Abnormality         Status                     ---------                               -----------         ------                     CBC Auto Differential[[id number]]        Abnormal            Final result                  Please view results for these tests on the individual orders.     CBC Auto Differential [[id number]]  (Abnormal) Collected:  01/07/24 0857     Specimen: Blood Updated: 01/07/24 0908       WBC 20.12 10*3/mm3         RBC 4.57 10*6/mm3         Hemoglobin 12.3 g/dL         Hematocrit 37.1 %         MCV 81.2 fL         MCH 26.9 pg         MCHC 33.2 g/dL         RDW 13.3 %         RDW-SD 39.1 fl         MPV 8.9 fL         Platelets 290 10*3/mm3         Neutrophil % 91.9 %         Lymphocyte % 4.1 %         Monocyte % 3.5 %         Eosinophil % 0.0 %         Basophil % 0.1 %         Immature Grans % 0.4 %         Neutrophils, Absolute 18.47 10*3/mm3         Lymphocytes, Absolute 0.83 10*3/mm3         Monocytes, Absolute 0.70 10*3/mm3         Eosinophils, Absolute 0.01 10*3/mm3         Basophils, Absolute 0.03 10*3/mm3         Immature Grans, Absolute 0.08 10*3/mm3         nRBC 0.0 /100 WBC               Imaging Results (Last 24 Hours)         Procedure Component Value Units Date/Time     US Venous Doppler Lower Extremity Bilateral (duplex) [[id number]] Resulted: 01/07/24 1122       Updated: 01/07/24 1125     CT Angiogram Chest [[id number]] Collected: 01/07/24 1001       Updated: 01/07/24 1008     Narrative:       CT ANGIOGRAM CHEST- 1/7/2024 8:37 AM     HISTORY: Tachycardic; 3 months postpartum-breast-feeding. Fever and  chills. Left lower extremity pain., HISTORY of lower extremity DVT.     COMPARISON: None     TOTAL DOSE LENGTH PRODUCT: 233.21 mGy.cm. Automated exposure control was  also utilized to decrease patient radiation dose.     TECHNIQUE: Axial images of the chest are performed following IV  contrast. 2D, 3D, and MIPS reconstructed images are reviewed.     FINDINGS: No pulmonary emboli. No thoracic aortic aneurysm or  dissection. Heart is borderline prominent in size. Trace pericardial  fluid. Trace right pleural effusion considered. No pathologic  intrathoracic or axillary lymphadenopathy. Dense breast tissue related  to lactation.     Images of the upper abdomen demonstrate no adrenal nodules. Probable  hepatic steatosis.     Calcified  right lower lobe granuloma. Mild dependent basilar  atelectasis. No consolidation. No edema. No suspicious pulmonary nodule.  No discrete endobronchial lesion.     No focal aggressive regional bony lesions.        Impression:       1. No pulmonary emboli.  2. No thoracic aortic aneurysm or dissection. Borderline cardiomegaly.  Trace pericardial and right pleural fluid.  3. No parenchymal consolidation or suspicious nodularity. Calcified  right lower lobe granuloma.     This report was signed and finalized on 1/7/2024 10:05 AM by Dr. Dominique White MD.                I have personally reviewed and interpreted the radiology studies and ECG obtained at time of admission.      Assessment / Plan   Assessment:        Active Hospital Problems     Diagnosis      **Cellulitis           Treatment Plan  The patient will be admitted to my service here at [facility].      Cellulitis of left leg.  Elevated C-reactive protein.  Elevated procalcitonin.  White blood cells 20,000.  Patient was given Zosyn in ER.  Norco as needed.  Tylenol as needed.     History of DVT, start Lovenox 6 weeks ago.  IV hydration.  Doppler ultrasound lower extremities preliminary-BLE: No thrombus vis.   CTA scan of the chest-No pulmonary emboli, No thoracic aortic aneurysm or dissection, Borderline cardiomegaly, trace pericardial and right pleural fluid, No parenchymal consolidation or suspicious nodularity, Calcified right lower lobe granuloma.     Lovenox prophylaxis.     Hypokalemia.  P.o. potassium.     Hypomagnesia.  2 g magnesium IV ordered in ER.  Magnesium in AM.     Nutrition .  Regular diet     Blood culture pending.  COVID-19-negative     Medical Decision Making  Number and Complexity of problems: Cellulitis left lower extremity/history of DVT/hypokalemia/hypomagnesium  Differential Diagnosis: None     Conditions and Status        Condition is unchanged.     [facility] Data  External documents reviewed: ER notes .  Cardiac tracing (EKG,  "telemetry) interpretation: Daniela .  Radiology interpretation: CT scan/Doppler  Labs reviewed: Laboratory  Any tests that were considered but not ordered: Laboratory in AM     Decision rules/scores evaluated (example [id number], Wells, etc): None     Discussed with: Patient and      Care Planning  Shared decision making: Patient and   Code status and discussions: Full code     Disposition  Social Determinants of Health that impact treatment or disposition: From home  Estimated length of stay is 2 to 4 days.      I confirmed that the patient's advanced care plan is present, code status is documented, and a surrogate decision maker is listed in the patient's medical record.      The patient's surrogate decision maker is .      The patient was seen and examined by me on 1/7/2024 at 1140.     Electronically signed by Arturo Edwards MD, 01/07/24, 11:47 CST.            Renetta   Pharmacist  Pharmacy     Progress Notes      Signed     Date of Service: 01/07/24 1837  Creation Time: 01/07/24 1837     Signed         Pharmacy Dosing Service  Pharmacokinetics  Vancomycin Initial Evaluation  Assessment/Action/Plan:  Loading dose: Vancomycin 1750 mg IVPB ONCE @ 1930  Current Order: Vancomycin 1250 mg IVPB every 12 hours  Current end date/last dose:1/12/24 @ 1930  Levels: None at this time  Additional antimicrobial agent(s): Zosyn  MRSA Nasal PCR ordered: Yes (Vancomycin indication is pneumonia, bone/joint, SSTI or IAI)     Vancomycin dosage initiated based on population pharmacokinetic parameters. Pharmacy will continue to follow daily and adjust dose accordingly.      Subjective:  Waleska Gómez is a 33 y.o. female with a Vancomycin \"Pharmacy to Dose\" consult for the treatment of cellulitis , day 1 of 5 of treatment.     AUC Model Data:                          me Temp Pulse Resp BP Patient Position Device (Oxygen Therapy) SpO2   01/08/24 1114 103.1 (39.5) Abnormal  128 Abnormal  20 149/78 Lying room " air 99   01/08/24 0723 103.1 (39.5) Abnormal  177 Abnormal  20 101/66 Lying room air 98   01/08/24 0409 99.1 (37.3) 98 18 91/52 Lying room air 96   01/08/24 0045 98.2 (36.8) 100 18 107/53 Lying room air 98   01/07/24 2145 -- -- -- -- -- room air --   01/07/24 2059 99.7 (37.6) 114 18 117/49 Lying room air 97   01/07/24 1730 103.4 (39.7) Abnormal  -- -- -- -- -- --   01/07/24 1616 101.5 (38.6) Abnormal   135 Abnormal  18 117/59 Lying room air 98   Tem      Click to view them now.           Component  Ref Range & Units 1 d ago 2 yr ago 9 yr ago   WBC  3.40 - 10.80 10*3/mm3 20.12 High  19.27 High  12.43 High  R   RBC  3.77 - 5.28 10*6/mm3 4.57 4.31 4.75 R   Hemoglobin  12.0 - 15.9 g/dL 12.3 11.8 Low  12.9 R   Hematocrit  34.0 - 46.6 % 37.1 35.5 38.0 R   MCV  79.0 - 97.0 fL 81.2 82.4 80.0 Low  R   MCH  26.6 - 33.0 pg 26.9 27.4 27.2 Low  R   MCHC  31.5 - 35.7 g/dL 33.2 33.2 33.9 R   RDW  12.3 - 15.4 % 13.3 13.0    RDW-SD  37.0 - 54.0 fl 39.1 39.4 39.8 Low  R   MPV  6.0 - 12.0 fL 8.9 8.9    Platelets  140 - 450 10*3/mm3 290 282 208 R   Neutrophil %  42.7 - 76.0 % 91.9 High  89.3 High  63.00 R   Lymphocyte %  19.6 - 45.3 % 4.1 Low  7.4 Low  27.00 R   Monocyte %  5.0 - 12.0 % 3.5 Low  2.6 Low  7.00 R   Eosinophil %  0.3 - 6.2 % 0.0 Low              Current Facility-Administered Medications   Medication Dose Route Frequency Provider Last Rate Last Admin    acetaminophen (TYLENOL) tablet 650 mg  650 mg Oral Q6H PRN Arturo Edwards MD   650 mg at 01/07/24 1622    sennosides-docusate (PERICOLACE) 8.6-50 MG per tablet 2 tablet  2 tablet Oral BID Arturo Edwards MD   2 tablet at 01/08/24 1108    And    polyethylene glycol (MIRALAX) packet 17 g  17 g Oral Daily PRN Arturo Edwards MD        And    bisacodyl (DULCOLAX) EC tablet 5 mg  5 mg Oral Daily PRN Arturo Edwards MD        And    bisacodyl (DULCOLAX) suppository 10 mg  10 mg Rectal Daily PRN Arturo Edwards MD        Enoxaparin Sodium (LOVENOX) syringe 40 mg  40 mg  Subcutaneous Q24H Arturo Edwards MD   40 mg at 01/07/24 1258    HYDROcodone-acetaminophen (NORCO) 5-325 MG per tablet 1 tablet  1 tablet Oral Q6H PRN Arturo Edwards MD   1 tablet at 01/08/24 0724    ibuprofen (ADVIL,MOTRIN) tablet 400 mg  400 mg Oral Q6H PRN Arturo Edwards MD   400 mg at 01/07/24 1839    lactated ringers infusion  100 mL/hr Intravenous Continuous Arturo Edwards  mL/hr at 01/07/24 1248 100 mL/hr at 01/07/24 1248    ondansetron (ZOFRAN) injection 4 mg  4 mg Intravenous Q6H PRN Arturo Edwards MD        piperacillin-tazobactam (ZOSYN) IVPB 3.375 g in 100 mL NS (CD)  3.375 g Intravenous Q8H Arturo Edwards MD   3.375 g at 01/08/24 1108    sodium chloride 0.9 % flush 10 mL  10 mL Intravenous PRN Arturo Edwards MD        sodium chloride 0.9 % flush 10 mL  10 mL Intravenous Q12H Arturo Edwards MD   10 mL at 01/08/24 1110    sodium chloride 0.9 % flush 10 mL  10 mL Intravenous PRN Arturo Edwards MD        sodium chloride 0.9 % infusion 40 mL  40 mL Intravenous PRN Arturo Edwards MD        vancomycin 1250 mg/250 mL 0.9% NS IVPB ([facility])  1,250 mg Intravenous Q12H Arturo Edwards MD   1,250 mg at 01/08/24 0820

## 2024-01-08 NOTE — PROGRESS NOTES
[facility] Medicine Services  INPATIENT PROGRESS NOTE    Patient Name: Waleska Gómez  Date of Admission: 1/7/2024  Today's Date: 01/08/24  Length of Stay: 1  Primary Care Physician: Karen Howard APRN    Subjective   Chief Complaint: left leg cellulitis   HPI     Patient is a 33-year-old presented to the ER complaining of fevers and left legs red and swollen and inflamed.  Patient has a history of DVT and cellulitis in 2021.  Patient gave birth 3 months ago and is currently breast-feeding.  Patient stopped Lovenox about 6 weeks ago.  Patient denies any shortness of breath.   1/8/24  Patient is extremely anxious and in tears had her left lower extremity cellulitis marked off and she is thinking is getting worse continues to have fever 103 heart rate 120 sinus tachycardia remains on Vanco and Zosyn I gave the family reassurance as her WBC is unremarkable now her UA unremarkable chest x-ray has been unremarkable blood cultures remains unremarkable however we will consult ID to be on the safe side apparently 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 and she has been vulnerable for recurrent cellulitis since then I instructed nursing staff and the patient to elevate the leg and use Ace wraps      Review of Systems   All pertinent negatives and positives are as above. All other systems have been reviewed and are negative unless otherwise stated.     Objective    Temp:  [98.2 °F (36.8 °C)-103.4 °F (39.7 °C)] 103.1 °F (39.5 °C)  Heart Rate:  [] 177  Resp:  [18-20] 20  BP: ()/(49-70) 101/66  Physical Exam  Constitutional:       Appearance: She is ill-appearing.   HENT:      Head: Normocephalic and atraumatic.      Nose: Nose normal.   Eyes:      Pupils: Pupils are equal, round, and reactive to light.   Cardiovascular:      Rate and Rhythm: Normal rate and regular rhythm. Tachycardia present.      Pulses: Normal pulses.   Pulmonary:      Effort: Pulmonary  "effort is normal.      Breath sounds: Normal breath sounds.   Abdominal:      General: Abdomen is flat.      Palpations: Abdomen is soft.   Musculoskeletal:         General: Swelling present.      Cervical back: Normal range of motion.      Left lower leg: Edema present.   Skin:     Capillary Refill: Capillary refill takes less than 2 seconds.      Findings: Erythema and rash present.             Results Review:  I have reviewed the labs, radiology results, and diagnostic studies.    Laboratory Data:   Results from last 7 days   Lab Units 01/08/24  0425 01/07/24  0857   WBC 10*3/mm3 9.85 20.12*   HEMOGLOBIN g/dL 11.1* 12.3   HEMATOCRIT % 34.1 37.1   PLATELETS 10*3/mm3 212 290        Results from last 7 days   Lab Units 01/08/24  0425 01/07/24  0857   SODIUM mmol/L 138 136   POTASSIUM mmol/L 4.0 3.1*   CHLORIDE mmol/L 108* 100   CO2 mmol/L 21.0* 21.0*   BUN mg/dL 10 17   CREATININE mg/dL 0.69 0.92   CALCIUM mg/dL 8.4* 8.8   BILIRUBIN mg/dL 0.6 0.5   ALK PHOS U/L 76 95   ALT (SGPT) U/L 16 19   AST (SGOT) U/L 14 13   GLUCOSE mg/dL 107* 151*       Culture Data:   No results found for: \"BLOODCX\", \"URINECX\", \"WOUNDCX\", \"MRSACX\", \"RESPCX\", \"STOOLCX\"    Radiology Data:   Imaging Results (Last 24 Hours)       Procedure Component Value Units Date/Time    US Venous Doppler Lower Extremity Bilateral (duplex) [[id number]] Resulted: 01/07/24 1122     Updated: 01/07/24 1125    CT Angiogram Chest [[id number]] Collected: 01/07/24 1001     Updated: 01/07/24 1008    Narrative:      CT ANGIOGRAM CHEST- 1/7/2024 8:37 AM     HISTORY: Tachycardic; 3 months postpartum-breast-feeding. Fever and  chills. Left lower extremity pain., HISTORY of lower extremity DVT.     COMPARISON: None     TOTAL DOSE LENGTH PRODUCT: 233.21 mGy.cm. Automated exposure control was  also utilized to decrease patient radiation dose.     TECHNIQUE: Axial images of the chest are performed following IV  contrast. 2D, 3D, and MIPS reconstructed images are reviewed.   "   FINDINGS: No pulmonary emboli. No thoracic aortic aneurysm or  dissection. Heart is borderline prominent in size. Trace pericardial  fluid. Trace right pleural effusion considered. No pathologic  intrathoracic or axillary lymphadenopathy. Dense breast tissue related  to lactation.     Images of the upper abdomen demonstrate no adrenal nodules. Probable  hepatic steatosis.     Calcified right lower lobe granuloma. Mild dependent basilar  atelectasis. No consolidation. No edema. No suspicious pulmonary nodule.  No discrete endobronchial lesion.     No focal aggressive regional bony lesions.       Impression:      1. No pulmonary emboli.  2. No thoracic aortic aneurysm or dissection. Borderline cardiomegaly.  Trace pericardial and right pleural fluid.  3. No parenchymal consolidation or suspicious nodularity. Calcified  right lower lobe granuloma.     This report was signed and finalized on 1/7/2024 10:05 AM by Dr. Dominique White MD.               I have reviewed the patient's current medications.     Assessment/Plan   Assessment  Active Hospital Problems    Diagnosis     **Cellulitis        Treatment Plan  Cellulitis of left leg.  Elevated C-reactive protein.  Elevated procalcitonin.  White blood cells 20,000.  Patient was given Zosyn in ER.  Norco as needed.  Tylenol as needed.     History of DVT, start Lovenox 6 weeks ago.  IV hydration.  Doppler ultrasound lower extremities preliminary-BLE: No thrombus vis.   CTA scan of the chest-No pulmonary emboli, No thoracic aortic aneurysm or dissection, Borderline cardiomegaly, trace pericardial and right pleural fluid, No parenchymal consolidation or suspicious nodularity, Calcified right lower lobe granuloma.     Lovenox prophylaxis.     Hypokalemia.  P.o. potassium.     Hypomagnesia.  2 g magnesium IV ordered in ER.  Magnesium in AM.     Nutrition .  Regular diet     Blood culture pending.  COVID-19-negative  1/8/24  Remains on Zosyn and vancomycin spiked a fever of  103 COVID flu were unremarkable UA unremarkable white count has normalized Patient is extremely anxious and in tears had her left lower extremity cellulitis marked off and she is thinking is getting worse continues to have fever 103 heart rate 120 sinus tachycardia remains on Vanco and Zosyn I gave the family reassurance as her WBC is unremarkable now her UA unremarkable chest x-ray has been unremarkable blood cultures remains unremarkable however we will consult ID to be on the safe side apparently 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 and she has been vulnerable for recurrent cellulitis since then I instructed nursing staff and the patient to elevate the leg and use Ace wraps     Medical Decision Making  Number and Complexity of problems: Cellulitis left lower extremity/history of DVT/hypokalemia/hypomagnesium  Differential Diagnosis: None     Conditions and Status        Condition is unchanged.     [facility] Data  External documents reviewed: ER notes .  Cardiac tracing (EKG, telemetry) interpretation: Daniela .  Radiology interpretation: CT scan/Doppler  Labs reviewed: Laboratory  Any tests that were considered but not ordered: Laboratory in AM     Decision rules/scores evaluated (example [id number], Wells, etc): None     Discussed with: Patient and      Care Planning  Shared decision making: Patient and   Code status and discussions: Full code     Disposition  Social Determinants of Health that impact treatment or disposition: From home  Estimated length of stay is 2 to 4 days.          Electronically signed by Maurilio Foster MD, 01/08/24, 08:50 CST.

## 2024-01-08 NOTE — CONSULTS
"INFECTIOUS DISEASES CONSULT NOTE    Patient:  Waleska Gómez 33 y.o. female  ROOM # 396/1  YOB: 1990  MRN: [id number]  CSN:  [id number]  Admit date: 1/7/2024   Admitting Physician: Maurilio Foster MD  Primary Care Physician: Karen Howard APRN  REFERRING PROVIDER: Provider, No Known    Reason for Consultation: \"Cellulitis with SIRS, family request\" well    History of Present Illness/Chief Complaint: Pleasant 33-year-old woman.  She indicates Saturday evening she developed a temperature to 104.  She developed some redness and swelling involving the left lower extremity.  She has had a previous history of cellulitis involving the left lower extremity.  She has had previous melanoma removed from her left leg with lymph node dissection.  She has also had a previous left lower extremity DVT.  She indicates she has a 3-month-old child and is currently breast-feeding.  She is feeling better than on admission.  Infectious disease asked to evaluate and offer recommendations.    Current Scheduled Medications:   enoxaparin, 40 mg, Subcutaneous, Q24H  piperacillin-tazobactam, 3.375 g, Intravenous, Q8H  senna-docusate sodium, 2 tablet, Oral, BID  sodium chloride, 10 mL, Intravenous, Q12H  vancomycin, 1,250 mg, Intravenous, Q12H    Current PRN Medications:    acetaminophen    senna-docusate sodium **AND** polyethylene glycol **AND** bisacodyl **AND** bisacodyl    HYDROcodone-acetaminophen    ibuprofen    ondansetron    [COMPLETED] Insert Peripheral IV **AND** sodium chloride    sodium chloride    sodium chloride    Allergies:  No Known Allergies    Past Medical History: DVT.  Melanoma.  She is 3 months postpartum.  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    Past Surgical History: Appendectomy.  Exploratory laparotomy.  Femoral lymph node biopsy.  Tonsillectomy.  [place] tooth extraction.    Social History: Reports no heavy alcohol " "use.    Family History: Noncontributory    Exposure History: No close contacts have been ill    Review of Systems no urinary symptoms.  No diarrhea.  No rash.  No nausea or vomiting.  Lungs clear to auscultation without crackles  Heart regular rhythm without murmur  Abdomen soft and nontender    Vital Signs:  /52 (BP Location: Right arm, Patient Position: Lying)   Pulse 110   Temp 98.7 °F (37.1 °C)   Resp 20   Ht 157.5 cm (62\")   Wt 97.5 kg (215 lb)   LMP 11/15/2023 (Approximate)   SpO2 97%   Breastfeeding Yes   BMI 39.32 kg/m²  Temp (24hrs), Av.8 °F (38.2 °C), Min:98.2 °F (36.8 °C), Max:103.4 °F (39.7 °C)    Physical Exam  Vital signs - reviewed.  Line/IV site - No erythema or tenderness.  Left lower extremity with erythema shin area extending to distal medial thigh consistent with cellulitis.  Distal sensation and pulses intact  Abdomen is soft and nontender  Lungs without crackles  Heart regular rhythm without murmur    Lab Results:  CBC:   Results from last 7 days   Lab Units 24  0425 24  0857   WBC 10*3/mm3 9.85 20.12*   HEMOGLOBIN g/dL 11.1* 12.3   HEMATOCRIT % 34.1 37.1   PLATELETS 10*3/mm3 212 290     CMP:   Results from last 7 days   Lab Units 24  0425 24  0857   SODIUM mmol/L 138 136   POTASSIUM mmol/L 4.0 3.1*   CHLORIDE mmol/L 108* 100   CO2 mmol/L 21.0* 21.0*   BUN mg/dL 10 17   CREATININE mg/dL 0.69 0.92   CALCIUM mg/dL 8.4* 8.8   BILIRUBIN mg/dL 0.6 0.5   ALK PHOS U/L 76 95   ALT (SGPT) U/L 16 19   AST (SGOT) U/L 14 13   GLUCOSE mg/dL 107* 151*     Radiology:   CT angiogram of the chest completed 2024:  IMPRESSION:  1. No pulmonary emboli.  2. No thoracic aortic aneurysm or dissection. Borderline cardiomegaly.  Trace pericardial and right pleural fluid.  3. No parenchymal consolidation or suspicious nodularity. Calcified  right lower lobe granuloma.    Lower extremity venous ultrasound:  IMPRESSION:  Impression: There is no evidence of deep venous " thrombosis or  superficial thrombophlebitis of right or left lower extremities.    Additional Studies Reviewed:     Impression:   1.  Left lower extremity cellulitis.  Findings consistent with streptococcal cellulitis.  She has risk factors for streptococcal cellulitis including a previous episode of cellulitis, previous lymph node removal left lower extremity, previous surgery from melanoma resection left lower extremity, and some mild left lower extremity swelling.  2.  3 months postpartum  3.  Currently breast-feeding    Recommendations:    Suggest antibiotic treatment with ampicillin  Elevate her leg while she is in bed  Suspect she will have ongoing improvement  Likely transition to oral antibiotic treatment in the next 24 to 48 hours  Anticipate transitioning to oral amoxicillin at discharge  Continue to follow    Zay Gallagher MD  01/08/24  16:41 CST

## 2024-01-09 LAB
ALBUMIN SERPL-MCNC: 3.5 G/DL (ref 3.5–5.2)
ALBUMIN/GLOB SERPL: 1.3 G/DL
ALP SERPL-CCNC: 82 U/L (ref 39–117)
ALT SERPL W P-5'-P-CCNC: 21 U/L (ref 1–33)
ANION GAP SERPL CALCULATED.3IONS-SCNC: 10 MMOL/L (ref 5–15)
AST SERPL-CCNC: 17 U/L (ref 1–32)
BASOPHILS # BLD AUTO: 0.02 10*3/MM3 (ref 0–0.2)
BASOPHILS NFR BLD AUTO: 0.3 % (ref 0–1.5)
BILIRUB SERPL-MCNC: 0.4 MG/DL (ref 0–1.2)
BUN SERPL-MCNC: 7 MG/DL (ref 6–20)
BUN/CREAT SERPL: 11.7 (ref 7–25)
CALCIUM SPEC-SCNC: 8.6 MG/DL (ref 8.6–10.5)
CHLORIDE SERPL-SCNC: 106 MMOL/L (ref 98–107)
CO2 SERPL-SCNC: 23 MMOL/L (ref 22–29)
CREAT SERPL-MCNC: 0.6 MG/DL (ref 0.57–1)
DEPRECATED RDW RBC AUTO: 40.5 FL (ref 37–54)
EGFRCR SERPLBLD CKD-EPI 2021: 123.2 ML/MIN/1.73
EOSINOPHIL # BLD AUTO: 0.02 10*3/MM3 (ref 0–0.4)
EOSINOPHIL NFR BLD AUTO: 0.3 % (ref 0.3–6.2)
ERYTHROCYTE [DISTWIDTH] IN BLOOD BY AUTOMATED COUNT: 13.3 % (ref 12.3–15.4)
GLOBULIN UR ELPH-MCNC: 2.8 GM/DL
GLUCOSE SERPL-MCNC: 95 MG/DL (ref 65–99)
HCT VFR BLD AUTO: 35.8 % (ref 34–46.6)
HGB BLD-MCNC: 11.6 G/DL (ref 12–15.9)
IMM GRANULOCYTES # BLD AUTO: 0.03 10*3/MM3 (ref 0–0.05)
IMM GRANULOCYTES NFR BLD AUTO: 0.4 % (ref 0–0.5)
LYMPHOCYTES # BLD AUTO: 1 10*3/MM3 (ref 0.7–3.1)
LYMPHOCYTES NFR BLD AUTO: 13.2 % (ref 19.6–45.3)
MCH RBC QN AUTO: 27 PG (ref 26.6–33)
MCHC RBC AUTO-ENTMCNC: 32.4 G/DL (ref 31.5–35.7)
MCV RBC AUTO: 83.3 FL (ref 79–97)
MONOCYTES # BLD AUTO: 0.44 10*3/MM3 (ref 0.1–0.9)
MONOCYTES NFR BLD AUTO: 5.8 % (ref 5–12)
NEUTROPHILS NFR BLD AUTO: 6.07 10*3/MM3 (ref 1.7–7)
NEUTROPHILS NFR BLD AUTO: 80 % (ref 42.7–76)
NRBC BLD AUTO-RTO: 0 /100 WBC (ref 0–0.2)
PLATELET # BLD AUTO: 198 10*3/MM3 (ref 140–450)
PMV BLD AUTO: 9.5 FL (ref 6–12)
POTASSIUM SERPL-SCNC: 3.7 MMOL/L (ref 3.5–5.2)
PROT SERPL-MCNC: 6.3 G/DL (ref 6–8.5)
RBC # BLD AUTO: 4.3 10*6/MM3 (ref 3.77–5.28)
SODIUM SERPL-SCNC: 139 MMOL/L (ref 136–145)
WBC NRBC COR # BLD AUTO: 7.58 10*3/MM3 (ref 3.4–10.8)

## 2024-01-09 PROCEDURE — 25010000002 AMPICILLIN PER 500 MG: Performed by: INTERNAL MEDICINE

## 2024-01-09 PROCEDURE — 85025 COMPLETE CBC W/AUTO DIFF WBC: CPT | Performed by: HOSPITALIST

## 2024-01-09 PROCEDURE — 25010000002 ENOXAPARIN PER 10 MG: Performed by: FAMILY MEDICINE

## 2024-01-09 PROCEDURE — 80053 COMPREHEN METABOLIC PANEL: CPT | Performed by: HOSPITALIST

## 2024-01-09 RX ADMIN — AMPICILLIN SODIUM 2 G: 2 INJECTION, POWDER, FOR SOLUTION INTRAVENOUS at 22:48

## 2024-01-09 RX ADMIN — Medication 10 ML: at 09:53

## 2024-01-09 RX ADMIN — AMPICILLIN SODIUM 2 G: 2 INJECTION, POWDER, FOR SOLUTION INTRAVENOUS at 09:52

## 2024-01-09 RX ADMIN — ENOXAPARIN SODIUM 40 MG: 100 INJECTION SUBCUTANEOUS at 14:25

## 2024-01-09 RX ADMIN — DOCUSATE SODIUM 50 MG AND SENNOSIDES 8.6 MG 2 TABLET: 8.6; 5 TABLET, FILM COATED ORAL at 09:53

## 2024-01-09 RX ADMIN — HYDROCODONE BITARTRATE AND ACETAMINOPHEN 1 TABLET: 5; 325 TABLET ORAL at 22:52

## 2024-01-09 RX ADMIN — AMPICILLIN SODIUM 2 G: 2 INJECTION, POWDER, FOR SOLUTION INTRAVENOUS at 14:25

## 2024-01-09 RX ADMIN — DOCUSATE SODIUM 50 MG AND SENNOSIDES 8.6 MG 2 TABLET: 8.6; 5 TABLET, FILM COATED ORAL at 20:16

## 2024-01-09 RX ADMIN — AMPICILLIN SODIUM 2 G: 2 INJECTION, POWDER, FOR SOLUTION INTRAVENOUS at 03:31

## 2024-01-09 RX ADMIN — AMPICILLIN SODIUM 2 G: 2 INJECTION, POWDER, FOR SOLUTION INTRAVENOUS at 18:45

## 2024-01-09 RX ADMIN — Medication 10 ML: at 20:16

## 2024-01-09 NOTE — PLAN OF CARE
Problem: Adult Inpatient Plan of Care  Goal: Plan of Care Review  Outcome: Ongoing, Progressing  Flowsheets (Taken 1/9/2024 8594)  Outcome Evaluation: Patient RA. IV CDI, IID in between IV abx. Up standby. Left leg marked, wrapped and elevated. Tolerating diet. No c/o pain. VSS, safety maintained.

## 2024-01-09 NOTE — PROGRESS NOTES
[facility] Medicine Services  INPATIENT PROGRESS NOTE    Patient Name: Waleska Gómez  Date of Admission: 1/7/2024  Today's Date: 01/09/24  Length of Stay: 2  Primary Care Physician: Karen Howard APRN    Subjective   Chief Complaint: left leg cellulitis   Fever     Leg Pain          Patient is a 33-year-old presented to the ER complaining of fevers and left legs red and swollen and inflamed.  Patient has a history of DVT and cellulitis in 2021.  Patient gave birth 3 months ago and is currently breast-feeding.  Patient stopped Lovenox about 6 weeks ago.  Patient denies any shortness of breath.   1/9/24  Appreciate ID, Suggest antibiotic treatment with ampicillin  Elevate her leg while she is in bed  Suspect she will have ongoing improvement  Likely transition to oral antibiotic treatment in the next 24 to 48 hours, remains afebrile white count unremarkable  Anticipate transitioning to oral amoxicillin at discharge   1/8/24  Patient is extremely anxious and in tears had her left lower extremity cellulitis marked off and she is thinking is getting worse continues to have fever 103 heart rate 120 sinus tachycardia remains on Vanco and Zosyn I gave the family reassurance as her WBC is unremarkable now her UA unremarkable chest x-ray has been unremarkable blood cultures remains unremarkable however we will consult ID to be on the safe side apparently 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 and she has been vulnerable for recurrent cellulitis since then I instructed nursing staff and the patient to elevate the leg and use Ace wraps      Review of Systems   Constitutional:  Positive for fever.      All pertinent negatives and positives are as above. All other systems have been reviewed and are negative unless otherwise stated.     Objective    Temp:  [98.5 °F (36.9 °C)-103.1 °F (39.5 °C)] 98.5 °F (36.9 °C)  Heart Rate:  [] 101  Resp:  [18-20] 18  BP:  "(100-149)/(46-78) 100/56  Physical Exam  Constitutional:       Appearance: She is ill-appearing.   HENT:      Head: Normocephalic and atraumatic.      Nose: Nose normal.   Eyes:      Pupils: Pupils are equal, round, and reactive to light.   Cardiovascular:      Rate and Rhythm: Normal rate and regular rhythm. Tachycardia present.      Pulses: Normal pulses.   Pulmonary:      Effort: Pulmonary effort is normal.      Breath sounds: Normal breath sounds.   Abdominal:      General: Abdomen is flat.      Palpations: Abdomen is soft.   Musculoskeletal:         General: Swelling present.      Cervical back: Normal range of motion.      Left lower leg: Edema present.   Skin:     Capillary Refill: Capillary refill takes less than 2 seconds.      Findings: Erythema and rash present.             Results Review:  I have reviewed the labs, radiology results, and diagnostic studies.    Laboratory Data:   Results from last 7 days   Lab Units 01/09/24  0420 01/08/24  0425 01/07/24  0857   WBC 10*3/mm3 7.58 9.85 20.12*   HEMOGLOBIN g/dL 11.6* 11.1* 12.3   HEMATOCRIT % 35.8 34.1 37.1   PLATELETS 10*3/mm3 198 212 290        Results from last 7 days   Lab Units 01/09/24  0420 01/08/24  0425 01/07/24  0857   SODIUM mmol/L 139 138 136   POTASSIUM mmol/L 3.7 4.0 3.1*   CHLORIDE mmol/L 106 108* 100   CO2 mmol/L 23.0 21.0* 21.0*   BUN mg/dL 7 10 17   CREATININE mg/dL 0.60 0.69 0.92   CALCIUM mg/dL 8.6 8.4* 8.8   BILIRUBIN mg/dL 0.4 0.6 0.5   ALK PHOS U/L 82 76 95   ALT (SGPT) U/L 21 16 19   AST (SGOT) U/L 17 14 13   GLUCOSE mg/dL 95 107* 151*       Culture Data:   No results found for: \"BLOODCX\", \"URINECX\", \"WOUNDCX\", \"MRSACX\", \"RESPCX\", \"STOOLCX\"    Radiology Data:   Imaging Results (Last 24 Hours)       Procedure Component Value Units Date/Time    US Venous Doppler Lower Extremity Bilateral (duplex) [[id number]] Collected: 01/08/24 1246     Updated: 01/08/24 1249    Narrative:      History: Swelling       Impression:      Impression: There " is no evidence of deep venous thrombosis or  superficial thrombophlebitis of right or left lower extremities.     Comments: Bilateral lower extremity venous duplex exam was performed  using color Doppler flow, Doppler waveform analysis, and grayscale  imaging, with and without compression. There is no evidence of deep  venous thrombosis in the common femoral, superficial femoral, popliteal,  peroneal, anterior tibial, and posterior tibial veins bilaterally. No  thrombus is identified in the saphenofemoral junctions and greater  saphenous veins bilaterally.            This report was signed and finalized on 1/8/2024 12:46 PM by Dr. Steve Mauricio MD.               I have reviewed the patient's current medications.     Assessment/Plan   Assessment  Active Hospital Problems    Diagnosis     **Cellulitis        Treatment Plan  Cellulitis of left leg.  Elevated C-reactive protein.  Elevated procalcitonin.  White blood cells 20,000.  Patient was given Zosyn in ER.  Norco as needed.  Tylenol as needed.     History of DVT, start Lovenox 6 weeks ago.  IV hydration.  Doppler ultrasound lower extremities preliminary-BLE: No thrombus vis.   CTA scan of the chest-No pulmonary emboli, No thoracic aortic aneurysm or dissection, Borderline cardiomegaly, trace pericardial and right pleural fluid, No parenchymal consolidation or suspicious nodularity, Calcified right lower lobe granuloma.     Lovenox prophylaxis.     Hypokalemia.  P.o. potassium.     Hypomagnesia.  2 g magnesium IV ordered in ER.  Magnesium in AM.     Nutrition .  Regular diet     Blood culture pending.  COVID-19-negative  1/8/24  Remains on Zosyn and vancomycin spiked a fever of 103 COVID flu were unremarkable UA unremarkable white count has normalized Patient is extremely anxious and in tears had her left lower extremity cellulitis marked off and she is thinking is getting worse continues to have fever 103 heart rate 120 sinus tachycardia remains on Vanco and  Dione I gave the family reassurance as her WBC is unremarkable now her UA unremarkable chest x-ray has been unremarkable blood cultures remains unremarkable however we will consult ID to be on the safe side apparently 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 and she has been vulnerable for recurrent cellulitis since then I instructed nursing staff and the patient to elevate the leg and use Ace wraps   1/9/24  Appreciate ID, Suggest antibiotic treatment with ampicillin  Elevate her leg while she is in bed  Suspect she will have ongoing improvement  Likely transition to oral antibiotic treatment in the next 24 to 48 hours, remains afebrile white count unremarkable  Anticipate transitioning to oral amoxicillin at discharge   Medical Decision Making  Number and Complexity of problems: Cellulitis left lower extremity/history of DVT/hypokalemia/hypomagnesium  Differential Diagnosis: None     Conditions and Status        Condition is unchanged.     [facility] Data  External documents reviewed: ER notes .  Cardiac tracing (EKG, telemetry) interpretation: Daniela .  Radiology interpretation: CT scan/Doppler  Labs reviewed: Laboratory  Any tests that were considered but not ordered: Laboratory in AM     Decision rules/scores evaluated (example [id number], Wells, etc): None     Discussed with: Patient and      Care Planning  Shared decision making: Patient and   Code status and discussions: Full code     Disposition  Social Determinants of Health that impact treatment or disposition: From home  Estimated length of stay is 2 to 4 days.          Electronically signed by Maurilio Foster MD, 01/09/24, 09:45 CST.

## 2024-01-10 VITALS
DIASTOLIC BLOOD PRESSURE: 75 MMHG | HEIGHT: 62 IN | TEMPERATURE: 98.3 F | HEART RATE: 96 BPM | BODY MASS INDEX: 40.48 KG/M2 | SYSTOLIC BLOOD PRESSURE: 111 MMHG | RESPIRATION RATE: 16 BRPM | OXYGEN SATURATION: 99 % | WEIGHT: 220 LBS

## 2024-01-10 PROCEDURE — 25010000002 AMPICILLIN PER 500 MG: Performed by: INTERNAL MEDICINE

## 2024-01-10 PROCEDURE — 25010000002 ENOXAPARIN PER 10 MG: Performed by: FAMILY MEDICINE

## 2024-01-10 PROCEDURE — 99232 SBSQ HOSP IP/OBS MODERATE 35: CPT | Performed by: INTERNAL MEDICINE

## 2024-01-10 RX ORDER — AMPICILLIN 500 MG/1
500 CAPSULE ORAL 4 TIMES DAILY
Qty: 40 CAPSULE | Refills: 0 | Status: SHIPPED | OUTPATIENT
Start: 2024-01-10

## 2024-01-10 RX ADMIN — AMPICILLIN SODIUM 2 G: 2 INJECTION, POWDER, FOR SOLUTION INTRAVENOUS at 02:15

## 2024-01-10 RX ADMIN — AMPICILLIN SODIUM 2 G: 2 INJECTION, POWDER, FOR SOLUTION INTRAVENOUS at 10:15

## 2024-01-10 RX ADMIN — ENOXAPARIN SODIUM 40 MG: 100 INJECTION SUBCUTANEOUS at 13:32

## 2024-01-10 RX ADMIN — AMPICILLIN SODIUM 2 G: 2 INJECTION, POWDER, FOR SOLUTION INTRAVENOUS at 06:39

## 2024-01-10 RX ADMIN — Medication 10 ML: at 08:43

## 2024-01-10 RX ADMIN — DOCUSATE SODIUM 50 MG AND SENNOSIDES 8.6 MG 2 TABLET: 8.6; 5 TABLET, FILM COATED ORAL at 08:43

## 2024-01-10 NOTE — DISCHARGE SUMMARY
[facility]  DISCHARGE SUMMARY       Date of Admission: 1/7/2024  Date of Discharge:  1/10/2024  Primary Care Physician: Karen Howard APRN    Presenting Problem/History of Present Illness:  Patient is a 33-year-old presented to the ER complaining of fevers and left legs red and swollen and inflamed.  Patient has a history of DVT and cellulitis in 2021.  Patient gave birth 3 months ago and is currently breast-feeding.  Patient stopped Lovenox about 6 weeks ago.  Patient denies any shortness of breath.   1/9/24  Appreciate ID, Suggest antibiotic treatment with ampicillin  Elevate her leg while she is in bed  Suspect she will have ongoing improvement  Likely transition to oral antibiotic treatment in the next 24 to 48 hours, remains afebrile white count unremarkable  Anticipate transitioning to oral amoxicillin at discharge   1/8/24  Patient is extremely anxious and in tears had her left lower extremity cellulitis marked off and she is thinking is getting worse continues to have fever 103 heart rate 120 sinus tachycardia remains on Vanco and Zosyn I gave the family reassurance as her WBC is unremarkable now her UA unremarkable chest x-ray has been unremarkable blood cultures remains unremarkable however we will consult ID to be on the safe side apparently 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 and she has been vulnerable for recurrent cellulitis since then I instructed nursing staff and the patient to elevate the leg and use Ace wraps    Final Discharge Diagnoses:  Active Hospital Problems    Diagnosis     **Cellulitis        Consults: Infectious disease    Procedures Performed: None    Pertinent Test Results:       Imaging Results (All)       Procedure Component Value Units Date/Time    US Venous Doppler Lower Extremity Bilateral (duplex) [[id number]] Collected: 01/08/24 1246     Updated: 01/08/24 1249    Narrative:      History: Swelling        Impression:      Impression: There is no evidence of deep venous thrombosis or  superficial thrombophlebitis of right or left lower extremities.     Comments: Bilateral lower extremity venous duplex exam was performed  using color Doppler flow, Doppler waveform analysis, and grayscale  imaging, with and without compression. There is no evidence of deep  venous thrombosis in the common femoral, superficial femoral, popliteal,  peroneal, anterior tibial, and posterior tibial veins bilaterally. No  thrombus is identified in the saphenofemoral junctions and greater  saphenous veins bilaterally.            This report was signed and finalized on 1/8/2024 12:46 PM by Dr. Steve Mauricio MD.       CT Angiogram Chest [[id number]] Collected: 01/07/24 1001     Updated: 01/07/24 1008    Narrative:      CT ANGIOGRAM CHEST- 1/7/2024 8:37 AM     HISTORY: Tachycardic; 3 months postpartum-breast-feeding. Fever and  chills. Left lower extremity pain., HISTORY of lower extremity DVT.     COMPARISON: None     TOTAL DOSE LENGTH PRODUCT: 233.21 mGy.cm. Automated exposure control was  also utilized to decrease patient radiation dose.     TECHNIQUE: Axial images of the chest are performed following IV  contrast. 2D, 3D, and MIPS reconstructed images are reviewed.     FINDINGS: No pulmonary emboli. No thoracic aortic aneurysm or  dissection. Heart is borderline prominent in size. Trace pericardial  fluid. Trace right pleural effusion considered. No pathologic  intrathoracic or axillary lymphadenopathy. Dense breast tissue related  to lactation.     Images of the upper abdomen demonstrate no adrenal nodules. Probable  hepatic steatosis.     Calcified right lower lobe granuloma. Mild dependent basilar  atelectasis. No consolidation. No edema. No suspicious pulmonary nodule.  No discrete endobronchial lesion.     No focal aggressive regional bony lesions.       Impression:      1. No pulmonary emboli.  2. No thoracic aortic aneurysm or  dissection. Borderline cardiomegaly.  Trace pericardial and right pleural fluid.  3. No parenchymal consolidation or suspicious nodularity. Calcified  right lower lobe granuloma.     This report was signed and finalized on 1/7/2024 10:05 AM by Dr. Dominique White MD.             LAB RESULTS:      Lab 01/09/24  0420 01/08/24  0425 01/07/24  0857   WBC 7.58 9.85 20.12*   HEMOGLOBIN 11.6* 11.1* 12.3   HEMATOCRIT 35.8 34.1 37.1   PLATELETS 198 212 290   NEUTROS ABS 6.07 8.73* 18.47*   IMMATURE GRANS (ABS) 0.03 0.03 0.08*   LYMPHS ABS 1.00 0.79 0.83   MONOS ABS 0.44 0.28 0.70   EOS ABS 0.02 0.00 0.01   MCV 83.3 83.8 81.2   CRP  --   --  12.27*   PROCALCITONIN  --   --  1.62*   LACTATE  --   --  1.3         Lab 01/09/24 0420 01/08/24 0425 01/07/24  0857   SODIUM 139 138 136   POTASSIUM 3.7 4.0 3.1*   CHLORIDE 106 108* 100   CO2 23.0 21.0* 21.0*   ANION GAP 10.0 9.0 15.0   BUN 7 10 17   CREATININE 0.60 0.69 0.92   EGFR 123.2 119.2 85.5   GLUCOSE 95 107* 151*   CALCIUM 8.6 8.4* 8.8   MAGNESIUM  --  2.0 1.5*   HEMOGLOBIN A1C  --  5.30  --    TSH  --  1.460  --          Lab 01/09/24 0420 01/08/24  0425 01/07/24  0857   TOTAL PROTEIN 6.3 5.9* 7.6   ALBUMIN 3.5 3.5 4.6   GLOBULIN 2.8 2.4 3.0   ALT (SGPT) 21 16 19   AST (SGOT) 17 14 13   BILIRUBIN 0.4 0.6 0.5   ALK PHOS 82 76 95             Lab 01/08/24  0425   CHOLESTEROL 123   LDL CHOL 64   HDL CHOL 33*   TRIGLYCERIDES 147             Brief Urine Lab Results  (Last result in the past 365 days)        Color   Clarity   Blood   Leuk Est   Nitrite   Protein   CREAT   Urine HCG        01/07/24 0907 Yellow   Clear   Negative   Negative   Negative   30 mg/dL (1+)                 Microbiology Results (last 10 days)       Procedure Component Value - Date/Time    MRSA Screen, PCR (Inpatient) - Swab, Nares [[id number]]  (Normal) Collected: 01/07/24 2143    Lab Status: Final result Specimen: Swab from Nares Updated: 01/07/24 2303     MRSA PCR No MRSA Detected    Narrative:      The  negative predictive value of this diagnostic test is high and should only be used to consider de-escalating anti-MRSA therapy. A positive result may indicate colonization with MRSA and must be correlated clinically.    Blood Culture - Blood, Hand, Left [[id number]]  (Normal) Collected: 01/07/24 0913    Lab Status: Preliminary result Specimen: Blood from Hand, Left Updated: 01/10/24 0930     Blood Culture No growth at 3 days    COVID PRE-OP / PRE-PROCEDURE SCREENING ORDER (NO ISOLATION) - Swab, Nasopharynx [[id number]]  (Normal) Collected: 01/07/24 0908    Lab Status: Final result Specimen: Swab from Nasopharynx Updated: 01/07/24 1005    Narrative:      The following orders were created for panel order COVID PRE-OP / PRE-PROCEDURE SCREENING ORDER (NO ISOLATION) - Swab, Nasopharynx.  Procedure                               Abnormality         Status                     ---------                               -----------         ------                     COVID-19, FLU A/B, RSV P...[[id number]]  Normal              Final result                 Please view results for these tests on the individual orders.    COVID-19, FLU A/B, RSV PCR 1 HR TAT - Swab, Nasopharynx [[id number]]  (Normal) Collected: 01/07/24 0908    Lab Status: Final result Specimen: Swab from Nasopharynx Updated: 01/07/24 1005     COVID19 Not Detected     Influenza A PCR Not Detected     Influenza B PCR Not Detected     RSV, PCR Not Detected    Narrative:      Fact sheet for providers: https://www.fda.gov/media/413680/download    Fact sheet for patients: https://www.fda.gov/media/112634/download    Test performed by PCR.    Blood Culture - Blood, Arm, Left [[id number]]  (Normal) Collected: 01/07/24 0857    Lab Status: Preliminary result Specimen: Blood from Arm, Left Updated: 01/10/24 0915     Blood Culture No growth at 3 days            Hospital Course:   Patient is a 33-year-old presented to the ER complaining of fevers and left legs red and swollen and  "inflamed.  Patient has a history of DVT and cellulitis in 2021.  Patient gave birth 3 months ago and is currently breast-feeding.  Patient stopped Lovenox about 6 weeks ago.  Patient denies any shortness of breath.   1/9/24  Appreciate ID, Suggest antibiotic treatment with ampicillin  Elevate her leg while she is in bed  Suspect she will have ongoing improvement  Likely transition to oral antibiotic treatment in the next 24 to 48 hours, remains afebrile white count unremarkable  Anticipate transitioning to oral amoxicillin at discharge   1/8/24  Patient is extremely anxious and in tears had her left lower extremity cellulitis marked off and she is thinking is getting worse continues to have fever 103 heart rate 120 sinus tachycardia remains on Vanco and Zosyn I gave the family reassurance as her WBC is unremarkable now her UA unremarkable chest x-ray has been unremarkable blood cultures remains unremarkable however we will consult ID to be on the safe side apparently 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      Nose: Nose normal.   Eyes:      Extraocular Movements: Extraocular movements intact.      Pupils: Pupils are equal, round, and reactive to light.   Cardiovascular:      Rate and Rhythm: Normal rate and regular rhythm.   Pulmonary:      Effort: Respiratory distress present.      Breath sounds: Wheezing present.   Musculoskeletal:         General: Swelling and signs of injury present.      Cervical back: Normal range of motion.      " Left lower leg: Edema present.   Skin:     General: Skin is warm.   Neurological:      Mental Status: She is alert.           Condition on Discharge: Stable    Discharge Disposition:  Home or Self Care    Discharge Medications:     Discharge Medications        New Medications        Instructions Start Date   ampicillin 500 MG capsule  Commonly known as: PRINCIPEN   500 mg, Oral, 4 Times Daily             Continue These Medications        Instructions Start Date   cholecalciferol 25 MCG (1000 UT) tablet  Commonly known as: VITAMIN D3   2,000 Units, Oral, Daily      magnesium gluconate 250 MG tablet tablet   500 mg, Oral, Daily      norethindrone 0.35 MG tablet  Commonly known as: MICRONOR   1 tablet, Oral, Daily      PRENATAL VITAMIN PO   1 tablet, Oral, Daily                   Discharge Diet: American Heart Association    Activity at Discharge: As tolerated elevate left lower extremity and keep Ace wraps on    Follow-up Appointments:   No future appointments.    Test Results Pending at Discharge: none    Electronically signed by Maurilio Foster MD, 01/10/24, 11:10 CST.    Time: 35 minutes.

## 2024-01-10 NOTE — PLAN OF CARE
Goal Outcome Evaluation:  Plan of Care Reviewed With: patient        Progress: improving   Patient voiced she is doing better, swelling has decrease. Patient has discomfort with activity related to swelling, lungs clear, alert and oriented. IV no s/s infection. Bowel sounds positive. Patient voiced readiness to go home. JAY Carrillo RN

## 2024-01-10 NOTE — PAYOR COMM NOTE
"1/10/24. [facility] [phone]  [phone]      1/10/24 FAXING ADDITIONAL CLINICAL              Abelino Gómez (33 y.o. Female)       Date of Birth   1990    Social Security Number       Address   [address] [place] [address]    Home Phone   [phone]    MRN   [id number]       Mormonism   Sikh    Marital Status                               Admission Date   1/7/24    Admission Type   Emergency    Admitting Provider   Maurilio Foster MD    Attending Provider   Maurilio Foster MD    Department, Room/Bed   [address], 396/1       Discharge Date       Discharge Disposition       Discharge Destination                                 Attending Provider: Maurilio Foster MD    Allergies: No Known Allergies    Isolation: None   Infection: None   Code Status: CPR    Ht: 157.5 cm (62\")   Wt: 99.8 kg (220 lb)    Admission Cmt: None   Principal Problem: Cellulitis [L03.90]                   Active Insurance as of 1/7/2024       Primary Coverage       Payor Plan Insurance Group Employer/Plan Group    [facility] [employer] [facility] EMPLOYEE [id number]       Payor Plan Address Payor Plan Phone Number Payor Plan Fax Number Effective Dates    [address] [phone]  1/1/2022 - None Entered    [place] [address]         Subscriber Name Subscriber Birth Date Member ID       ABELINO GÓMEZ 1990 [id number]                     Emergency Contacts        (Rel.) Home Phone Work Phone Mobile Phone    Conner Snow (Spouse) [phone] -- --    Kira Mcgill (Mother) [phone] -- --             [facility] Encounter Date/Time: 1/7/2024 0838   Hospital Account: [id number]    MRN: [id number]   Patient:  Abelino Gómez   Contact Serial #: [id number]   SSN:          ENCOUNTER             Patient Class: Inpatient   Unit: [address] Service: Medicine     Bed: 396/1   Admitting Provider: " Maurilio Foster MD   Referring Physician: Provider, No Known   Attending Provider: Maurilio Foster MD   Adm Diagnosis: Cellulitis [L03.90]               PATIENT          Name: Abelino Gómez : 1990 (33 yrs)   Address: [address] Sex: Female   City: [place] [address]   County: [facility]   Marital Status:  Ethnicity: NOT                                                                              Race: WHITE   Primary Care Provider: Karen Howard APRN Patients Phone: Home Phone: [phone]         EMERGENCY CONTACT   Contact Name Legal Guardian? Relationship to Patient Home Phone Work Phone   1. Conner Snow  2. Kira Mcgill      Spouse  Mother [phone]           GUARANTOR            Guarantor: Abelino Gómez     : 1990   Address: [address] Sex: [place] [address]     Relation to Patient: Self       Home Phone: [phone]   Guarantor ID: [id number]       Work Phone:     GUARANTOR EMPLOYER   Employer: [employer]         Status: FULL TIME   COVERAGE          PRIMARY INSURANCE   Payor: [place] Plan: [employer] EM[employer]EE   Group Number: [id number] Insurance Type: INDEMNITY   Subscriber Name: ABELINO GÓMEZ Subscriber : 1990   Subscriber ID: [id number] Coverage Address: [facility] [id number]  [place] [address]   Pat. Rel. to Subscriber: Self Coverage Phone: [phone]   SECONDARY INSURANCE   Payor: N/A Plan: N/A   Group Number:   Insurance Type:     Subscriber Name:   Subscriber :     Subscriber ID:   Coverage Address:     Pat. Rel. to Subscriber:   Coverage Phone:        Contact Serial # ([id number])         January 10, 2024    Chart ID ([id number])             Zay Flores MD   Physician  Infectious Disease     Consults      Signed     Date of Service: 24  Creation Time: 24     Signed         INFECTIOUS DISEASES CONSULT NOTE     Patient:  Abelino Gómez 33 y.o.  "female  ROOM # 396/1  YOB: 1990  MRN: [id number]  CSN:    [id number]  Admit date: 1/7/2024   Admitting Physician: Maurilio Foster MD  Primary Care Physician: Karen Howard APRN  REFERRING PROVIDER: Provider, No Known     Reason for Consultation: \"Cellulitis with SIRS, family request\" well     History of Present Illness/Chief Complaint: Pleasant 33-year-old woman.  She indicates Saturday evening she developed a temperature to 104.  She developed some redness and swelling involving the left lower extremity.  She has had a previous history of cellulitis involving the left lower extremity.  She has had previous melanoma removed from her left leg with lymph node dissection.  She has also had a previous left lower extremity DVT.  She indicates she has a 3-month-old child and is currently breast-feeding.  She is feeling better than on admission.  Infectious disease asked to evaluate and offer recommendations.     Current Scheduled Medications:   enoxaparin, 40 mg, Subcutaneous, Q24H  piperacillin-tazobactam, 3.375 g, Intravenous, Q8H  senna-docusate sodium, 2 tablet, Oral, BID  sodium chloride, 10 mL, Intravenous, Q12H  vancomycin, 1,250 mg, Intravenous, Q12H     Current PRN Medications:    acetaminophen    senna-docusate sodium **AND** polyethylene glycol **AND** bisacodyl **AND** bisacodyl    HYDROcodone-acetaminophen    ibuprofen    ondansetron    [COMPLETED] Insert Peripheral IV **AND** sodium chloride    sodium chloride    sodium chloride     Allergies:  No Known Allergies     Past Medical History: DVT.  Melanoma.  She is 3 months postpartum.  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     Past Surgical History: Appendectomy.  Exploratory laparotomy.  Femoral lymph node biopsy.  Tonsillectomy.  [place] tooth extraction.     Social History: Reports no heavy alcohol use.     Family History: Noncontributory     Exposure History: No close " "contacts have been ill     Review of Systems no urinary symptoms.  No diarrhea.  No rash.  No nausea or vomiting.  Lungs clear to auscultation without crackles  Heart regular rhythm without murmur  Abdomen soft and nontender     Vital Signs:  /52 (BP Location: Right arm, Patient Position: Lying)   Pulse 110   Temp 98.7 °F (37.1 °C)   Resp 20   Ht 157.5 cm (62\")   Wt 97.5 kg (215 lb)   LMP 11/15/2023 (Approximate)   SpO2 97%   Breastfeeding Yes   BMI 39.32 kg/m²  Temp (24hrs), Av.8 °F (38.2 °C), Min:98.2 °F (36.8 °C), Max:103.4 °F (39.7 °C)     Physical Exam  Vital signs - reviewed.  Line/IV site - No erythema or tenderness.  Left lower extremity with erythema shin area extending to distal medial thigh consistent with cellulitis.  Distal sensation and pulses intact  Abdomen is soft and nontender  Lungs without crackles  Heart regular rhythm without murmur     Lab Results:  CBC:         Results from last 7 days   Lab Units 24  0425 24  0857   WBC 10*3/mm3 9.85 20.12*   HEMOGLOBIN g/dL 11.1* 12.3   HEMATOCRIT % 34.1 37.1   PLATELETS 10*3/mm3 212 290      CMP:         Results from last 7 days   Lab Units 24  0425 24  0857   SODIUM mmol/L 138 136   POTASSIUM mmol/L 4.0 3.1*   CHLORIDE mmol/L 108* 100   CO2 mmol/L 21.0* 21.0*   BUN mg/dL 10 17   CREATININE mg/dL 0.69 0.92   CALCIUM mg/dL 8.4* 8.8   BILIRUBIN mg/dL 0.6 0.5   ALK PHOS U/L 76 95   ALT (SGPT) U/L 16 19   AST (SGOT) U/L 14 13   GLUCOSE mg/dL 107* 151*      Radiology:   CT angiogram of the chest completed 2024:  IMPRESSION:  1. No pulmonary emboli.  2. No thoracic aortic aneurysm or dissection. Borderline cardiomegaly.  Trace pericardial and right pleural fluid.  3. No parenchymal consolidation or suspicious nodularity. Calcified  right lower lobe granuloma.     Lower extremity venous ultrasound:  IMPRESSION:  Impression: There is no evidence of deep venous thrombosis or  superficial thrombophlebitis of " right or left lower extremities.     Additional Studies Reviewed:      Impression:   1.  Left lower extremity cellulitis.  Findings consistent with streptococcal cellulitis.  She has risk factors for streptococcal cellulitis including a previous episode of cellulitis, previous lymph node removal left lower extremity, previous surgery from melanoma resection left lower extremity, and some mild left lower extremity swelling.  2.  3 months postpartum  3.  Currently breast-feeding     Recommendations:    Suggest antibiotic treatment with ampicillin  Elevate her leg while she is in bed  Suspect she will have ongoing improvement  Likely transition to oral antibiotic treatment in the next 24 to 48 hours  Anticipate transitioning to oral amoxicillin at discharge  Continue to follow     Zay Gallagher MD  01/08/24  16:41 CST                    Maurilio Foster MD   Physician  Hospitalist     Progress Notes      Signed     Date of Service: 01/09/24 0945  Creation Time: 01/09/24 0945     Signed              [facility] Medicine Services  INPATIENT PROGRESS NOTE     Patient Name: Waleska Gómez  Date of Admission: 1/7/2024  Today's Date: 01/09/24  Length of Stay: 2  Primary Care Physician: Karen Howard APRN     Subjective   Chief Complaint: left leg cellulitis   Fever      Leg Pain            Patient is a 33-year-old presented to the ER complaining of fevers and left legs red and swollen and inflamed.  Patient has a history of DVT and cellulitis in 2021.  Patient gave birth 3 months ago and is currently breast-feeding.  Patient stopped Lovenox about 6 weeks ago.  Patient denies any shortness of breath.   1/9/24  Appreciate ID, Suggest antibiotic treatment with ampicillin  Elevate her leg while she is in bed  Suspect she will have ongoing improvement  Likely transition to oral antibiotic treatment in the next 24 to 48 hours, remains afebrile white count unremarkable  Anticipate  transitioning to oral amoxicillin at discharge   1/8/24  Patient is extremely anxious and in tears had her left lower extremity cellulitis marked off and she is thinking is getting worse continues to have fever 103 heart rate 120 sinus tachycardia remains on Vanco and Zosyn I gave the family reassurance as her WBC is unremarkable now her UA unremarkable chest x-ray has been unremarkable blood cultures remains unremarkable however we will consult ID to be on the safe side apparently 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 and she has been vulnerable for recurrent cellulitis since then I instructed nursing staff and the patient to elevate the leg and use Ace wraps        Review of Systems   Constitutional:  Positive for fever.      All pertinent negatives and positives are as above. All other systems have been reviewed and are negative unless otherwise stated.      Objective    Temp:  [98.5 °F (36.9 °C)-103.1 °F (39.5 °C)] 98.5 °F (36.9 °C)  Heart Rate:  [] 101  Resp:  [18-20] 18  BP: (100-149)/(46-78) 100/56  Physical Exam  Constitutional:       Appearance: She is ill-appearing.   HENT:      Head: Normocephalic and atraumatic.      Nose: Nose normal.   Eyes:      Pupils: Pupils are equal, round, and reactive to light.   Cardiovascular:      Rate and Rhythm: Normal rate and regular rhythm. Tachycardia present.      Pulses: Normal pulses.   Pulmonary:      Effort: Pulmonary effort is normal.      Breath sounds: Normal breath sounds.   Abdominal:      General: Abdomen is flat.      Palpations: Abdomen is soft.   Musculoskeletal:         General: Swelling present.      Cervical back: Normal range of motion.      Left lower leg: Edema present.   Skin:     Capillary Refill: Capillary refill takes less than 2 seconds.      Findings: Erythema and rash present.                  Results Review:  I have reviewed the labs, radiology results, and diagnostic studies.     Laboratory Data:          Results  "from last 7 days   Lab Units 01/09/24  0420 01/08/24  0425 01/07/24  0857   WBC 10*3/mm3 7.58 9.85 20.12*   HEMOGLOBIN g/dL 11.6* 11.1* 12.3   HEMATOCRIT % 35.8 34.1 37.1   PLATELETS 10*3/mm3 198 212 290                Results from last 7 days   Lab Units 01/09/24  0420 01/08/24  0425 01/07/24  0857   SODIUM mmol/L 139 138 136   POTASSIUM mmol/L 3.7 4.0 3.1*   CHLORIDE mmol/L 106 108* 100   CO2 mmol/L 23.0 21.0* 21.0*   BUN mg/dL 7 10 17   CREATININE mg/dL 0.60 0.69 0.92   CALCIUM mg/dL 8.6 8.4* 8.8   BILIRUBIN mg/dL 0.4 0.6 0.5   ALK PHOS U/L 82 76 95   ALT (SGPT) U/L 21 16 19   AST (SGOT) U/L 17 14 13   GLUCOSE mg/dL 95 107* 151*         Culture Data:   No results found for: \"BLOODCX\", \"URINECX\", \"WOUNDCX\", \"MRSACX\", \"RESPCX\", \"STOOLCX\"     Radiology Data:   Imaging Results (Last 24 Hours)         Procedure Component Value Units Date/Time     US Venous Doppler Lower Extremity Bilateral (duplex) [[id number]] Collected: 01/08/24 1246       Updated: 01/08/24 1249     Narrative:       History: Swelling        Impression:       Impression: There is no evidence of deep venous thrombosis or  superficial thrombophlebitis of right or left lower extremities.     Comments: Bilateral lower extremity venous duplex exam was performed  using color Doppler flow, Doppler waveform analysis, and grayscale  imaging, with and without compression. There is no evidence of deep  venous thrombosis in the common femoral, superficial femoral, popliteal,  peroneal, anterior tibial, and posterior tibial veins bilaterally. No  thrombus is identified in the saphenofemoral junctions and greater  saphenous veins bilaterally.            This report was signed and finalized on 1/8/2024 12:46 PM by Dr. Steve Mauricio MD.                   I have reviewed the patient's current medications.      Assessment/Plan   Assessment       Active Hospital Problems     Diagnosis      **Cellulitis           Treatment Plan  Cellulitis of left leg.  Elevated " C-reactive protein.  Elevated procalcitonin.  White blood cells 20,000.  Patient was given Zosyn in ER.  Norco as needed.  Tylenol as needed.     History of DVT, start Lovenox 6 weeks ago.  IV hydration.  Doppler ultrasound lower extremities preliminary-BLE: No thrombus vis.   CTA scan of the chest-No pulmonary emboli, No thoracic aortic aneurysm or dissection, Borderline cardiomegaly, trace pericardial and right pleural fluid, No parenchymal consolidation or suspicious nodularity, Calcified right lower lobe granuloma.     Lovenox prophylaxis.     Hypokalemia.  P.o. potassium.     Hypomagnesia.  2 g magnesium IV ordered in ER.  Magnesium in AM.     Nutrition .  Regular diet     Blood culture pending.  COVID-19-negative  1/8/24  Remains on Zosyn and vancomycin spiked a fever of 103 COVID flu were unremarkable UA unremarkable white count has normalized Patient is extremely anxious and in tears had her left lower extremity cellulitis marked off and she is thinking is getting worse continues to have fever 103 heart rate 120 sinus tachycardia remains on Vanco and Zosyn I gave the family reassurance as her WBC is unremarkable now her UA unremarkable chest x-ray has been unremarkable blood cultures remains unremarkable however we will consult ID to be on the safe side apparently 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 and she has been vulnerable for recurrent cellulitis since then I instructed nursing staff and the patient to elevate the leg and use Ace wraps   1/9/24  Appreciate ID, Suggest antibiotic treatment with ampicillin  Elevate her leg while she is in bed  Suspect she will have ongoing improvement  Likely transition to oral antibiotic treatment in the next 24 to 48 hours, remains afebrile white count unremarkable  Anticipate transitioning to oral amoxicillin at discharge   Medical Decision Making  Number and Complexity of problems: Cellulitis left lower extremity/history of  DVT/hypokalemia/hypomagnesium  Differential Diagnosis: None     Conditions and Status        Condition is unchanged.     [facility]  External documents reviewed: ER notes .  Cardiac tracing (EKG, telemetry) interpretation: Daniela .  Radiology interpretation: CT scan/Doppler  Labs reviewed: Laboratory  Any tests that were considered but not ordered: Laboratory in AM     Decision rules/scores evaluated (example [id number], Wells, etc): None     Discussed with: Patient and      Care Planning  Shared decision making: Patient and   Code status and discussions: Full code     Disposition  Social Determinants of Health that impact treatment or disposition: From home  Estimated length of stay is 2 to 4 days.            Electronically signed by Maurilio Foster MD, 01/09/24, 09:45 CST.              4 2046 98.7 (37.1) 102 20 102/57 Lying room air 98   01/08/24 1457 98.7 (37.1) 110 20 125/52 Lying room air 97   01/08/24 1114 103.1 (39.5) Abnormal  128 Abnormal  20 149/78 Lying room air 99   01/08/24 0820 -- -- -- -- -- room air --   01/08/24 0723 103.1 (39.5) Abnormal  177 Abnormal  20 101/66 Lying room air 98   01/08/24 0409 99.1 (37.3) 98 18 91/52 Lying room air 96   01/08/24 0045 98.2 (36.8) 100 18 107/53 Lying room air 98          utcome Evaluation: Patient RA. IV CDI, IID in between IV abx. Up standby. Left leg marked, wrapped and elevated. Tolerating diet. No c/o pain. VSS, safety maintained.        Ref Range & Units 1 d ago 2 d ago 3 d ago 2 yr ago 9 yr ago   WBC  3.40 - 10.80 10*3/mm3 7.58 9.85 20.12 High  19.27 High  12.43 High  R   RBC  3.77 - 5.28 10*6/mm3 4.30 4.07 4.57 4.31 4.75 R   Hemoglobin  12.0 - 15.9 g/dL 11.6 Low  11.1 Low  12.3 11.8 Low  12.9 R   Hematocrit  34.0 - 46.6 % 35.8 34.1 37.1 35.5 38.0 R   MCV  79.0 - 97.0 fL 83.3 83.8 81.2 82.4 80.0 Low  R   MCH  26.6 - 33.0 pg 27.0 27.3 26.9 27.4 27.2 Low  R   MCHC  31.5 - 35.7 g/dL 32.4 32.6 33.2 33.2 33.9 R   RDW  12.3 - 15.4 % 13.3 13.5 13.3 13.0     RDW-SD  37.0 - 54.0 fl 40.5 41.4 39.1 39.4 39.8 Low  R   MPV  6.0 - 12.0 fL 9.5 9.0 8.9 8.9    Platelets  140 - 450 10*3/mm3 198 212 290 282 208 R   Neutrophil %  42.7 - 76.0 % 80.0 High  88.7 High  91.9 High  89.3 High  63.00 R   Lymphocyte %  19.6 - 45.3 % 13.2 Low  8.0 Low  4.1 Low  7.4 Low  27.00 R   Monocyte %  5.0 - 12.0 % 5.8         Current Facility-Administered Medications   Medication Dose Route Frequency Provider Last Rate Last Admin    acetaminophen (TYLENOL) tablet 650 mg  650 mg Oral Q6H PRN Arturo Edwards MD   650 mg at 01/07/24 1622    ampicillin 2000 mg IVPB in 100 ml NS (MBP)  2 g Intravenous Q4H Zay Flores  mL/hr at 01/10/24 0639 2 g at 01/10/24 0639    sennosides-docusate (PERICOLACE) 8.6-50 MG per tablet 2 tablet  2 tablet Oral BID Arturo Edwards MD   2 tablet at 01/10/24 0843    And    polyethylene glycol (MIRALAX) packet 17 g  17 g Oral Daily PRN Arturo Edwards MD        And    bisacodyl (DULCOLAX) EC tablet 5 mg  5 mg Oral Daily PRN Arturo Edwards MD        And    bisacodyl (DULCOLAX) suppository 10 mg  10 mg Rectal Daily PRN Arturo Edwards MD        Enoxaparin Sodium (LOVENOX) syringe 40 mg  40 mg Subcutaneous Q24H Arturo Edwards MD   40 mg at 01/09/24 1425    HYDROcodone-acetaminophen (NORCO) 5-325 MG per tablet 1 tablet  1 tablet Oral Q6H PRN Arturo Edwards MD   1 tablet at 01/09/24 2252    ibuprofen (ADVIL,MOTRIN) tablet 400 mg  400 mg Oral Q6H PRN Arturo Edwards MD   400 mg at 01/07/24 1839    ondansetron (ZOFRAN) injection 4 mg  4 mg Intravenous Q6H PRN Arturo Edwards MD        sodium chloride 0.9 % flush 10 mL  10 mL Intravenous PRN Arturo Edwards MD        sodium chloride 0.9 % flush 10 mL  10 mL Intravenous Q12H Arturo Edwards MD   10 mL at 01/10/24 0843    sodium chloride 0.9 % flush 10 mL  10 mL Intravenous PRN Arturo Edwards MD        sodium chloride 0.9 % infusion 40 mL  40 mL Intravenous PRN Arturo Edwards MD

## 2024-01-10 NOTE — PROGRESS NOTES
"Infectious Diseases Progress Note    Patient:  Waleska Gómez  YOB: 1990  MRN: [id number]   Admit date: 1/7/2024   Admitting Physician: Maurilio Foster MD  Primary Care Physician: Karen Howard APRN    Chief Complaint/Interval History: She seems to be doing well.  Discomfort involving the left lower extremity has shown improvement.  She still has some erythema, but the intensity is faded.  She is without fever.  She had temperature to 103 on January 8 when she was admitted.  She subsequently has been afebrile.  She does note some soreness when she puts the leg dependent, but overall discomfort has shown improvement.  Discussed with pharmacy.  Reviewed options to continue oral antibiotic treatment post discharge.  From breast-feeding standpoint they felt ampicillin would be the most appropriate-they reviewed first generation cephalosporin, penicillin VK, and amoxicillin.    Intake/Output Summary (Last 24 hours) at 1/10/2024 1328  Last data filed at 1/10/2024 1015  Gross per 24 hour   Intake 580 ml   Output --   Net 580 ml     Allergies: No Known Allergies  Current Scheduled Medications:   ampicillin, 2 g, Intravenous, Q4H  enoxaparin, 40 mg, Subcutaneous, Q24H  senna-docusate sodium, 2 tablet, Oral, BID  sodium chloride, 10 mL, Intravenous, Q12H      Current PRN Medications:    acetaminophen    senna-docusate sodium **AND** polyethylene glycol **AND** bisacodyl **AND** bisacodyl    HYDROcodone-acetaminophen    ibuprofen    ondansetron    [COMPLETED] Insert Peripheral IV **AND** sodium chloride    sodium chloride    sodium chloride    Review of Systems see HPI    Vital Signs:  /75 (BP Location: Right arm, Patient Position: Lying)   Pulse 96   Temp 98.3 °F (36.8 °C) (Oral)   Resp 16   Ht 157.5 cm (62\")   Wt 99.8 kg (220 lb)   LMP 11/15/2023 (Approximate)   SpO2 99%   Breastfeeding Yes   BMI 40.24 kg/m²     Physical Exam  Vital signs - reviewed.  Line/IV site - No erythema, warmth, " induration, or tenderness.  Left lower extremity with improving erythema  More wrinkles are visible in the involved area of skin consistent with some improving edema  She has some edema left lower extremity greater than right but this seems slightly better than previous  Skin without rash    Lab Results:  CBC:   Results from last 7 days   Lab Units 01/09/24  0420 01/08/24  0425 01/07/24  0857   WBC 10*3/mm3 7.58 9.85 20.12*   HEMOGLOBIN g/dL 11.6* 11.1* 12.3   HEMATOCRIT % 35.8 34.1 37.1   PLATELETS 10*3/mm3 198 212 290     BMP:  Results from last 7 days   Lab Units 01/09/24  0420 01/08/24  0425 01/07/24  0857   SODIUM mmol/L 139 138 136   POTASSIUM mmol/L 3.7 4.0 3.1*   CHLORIDE mmol/L 106 108* 100   CO2 mmol/L 23.0 21.0* 21.0*   BUN mg/dL 7 10 17   CREATININE mg/dL 0.60 0.69 0.92   GLUCOSE mg/dL 95 107* 151*   CALCIUM mg/dL 8.6 8.4* 8.8   ALT (SGPT) U/L 21 16 19     Culture Results:   Blood Culture   Date Value Ref Range Status   01/07/2024 No growth at 3 days  Preliminary   01/07/2024 No growth at 3 days  Preliminary     Radiology: None    Additional Studies Reviewed: None    Impression:   Left lower extremity cellulitis consistent with streptococcal cellulitis-improving  3 months postpartum  She is continuing to breast-feed    Recommendations:   Okay with me for her to be discharged home today following her dose of ceftriaxone  Would suggest oral antibiotic treatment with ampicillin 500 mg orally 4 times a day for 7 days at discharge  Follow-up appointment in 10 to 14 days    Zay Gallagher MD

## 2024-01-11 NOTE — PAYOR COMM NOTE
"REF:   [id number]     [facility]  FAX  [phone]     Abelino Gómez N (33 y.o. Female)       Date of Birth   1990    Social Security Number       Address   [address] [place] [address]    Home Phone   [phone]    MRN   [id number]       Muslim   Jehovah's witness    Marital Status                               Admission Date   1/7/24    Admission Type   Emergency    Admitting Provider   Maurilio Foster MD    Attending Provider       Department, Room/Bed   [facility] 3C, 396/1       Discharge Date   1/10/2024    Discharge Disposition   Home or Self Care    Discharge Destination                                 Attending Provider: (none)   Allergies: No Known Allergies    Isolation: None   Infection: None   Code Status: Prior    Ht: 157.5 cm (62\")   Wt: 99.8 kg (220 lb)    Admission Cmt: None   Principal Problem: Cellulitis [L03.90]                   Active Insurance as of 1/7/2024       Primary Coverage       Payor Plan Insurance Group Employer/Plan Group    [facility] [employer] [id number]       Payor Plan Address Payor Plan Phone Number Payor Plan Fax Number Effective Dates    [address] [phone]  1/1/2022 - None Entered    [place] [address]         Subscriber Name Subscriber Birth Date Member ID       ABELINO GÓMEZ 1990 [id number]                     Emergency Contacts        (Rel.) Home Phone Work Phone Mobile Phone    Conner Snow (Spouse) [phone] -- --    Mcgill Kira (Mother) [phone] -- --                 Discharge Summary        Maurilio Foster MD at 01/10/24 1110                [facility] Medicine Services  DISCHARGE SUMMARY       Date of Admission: 1/7/2024  Date of Discharge:  1/10/2024  Primary Care Physician: Karen Howard APRN    Presenting Problem/History of Present Illness:  Patient is a 33-year-old presented to the ER complaining of fevers and " left legs red and swollen and inflamed.  Patient has a history of DVT and cellulitis in 2021.  Patient gave birth 3 months ago and is currently breast-feeding.  Patient stopped Lovenox about 6 weeks ago.  Patient denies any shortness of breath.   1/9/24  Appreciate ID, Suggest antibiotic treatment with ampicillin  Elevate her leg while she is in bed  Suspect she will have ongoing improvement  Likely transition to oral antibiotic treatment in the next 24 to 48 hours, remains afebrile white count unremarkable  Anticipate transitioning to oral amoxicillin at discharge   1/8/24  Patient is extremely anxious and in tears had her left lower extremity cellulitis marked off and she is thinking is getting worse continues to have fever 103 heart rate 120 sinus tachycardia remains on Vanco and Zosyn I gave the family reassurance as her WBC is unremarkable now her UA unremarkable chest x-ray has been unremarkable blood cultures remains unremarkable however we will consult ID to be on the safe side apparently 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 and she has been vulnerable for recurrent cellulitis since then I instructed nursing staff and the patient to elevate the leg and use Ace wraps    Final Discharge Diagnoses:  Active Hospital Problems    Diagnosis     **Cellulitis        Consults: Infectious disease    Procedures Performed: None    Pertinent Test Results:       Imaging Results (All)       Procedure Component Value Units Date/Time    US Venous Doppler Lower Extremity Bilateral (duplex) [[id number]] Collected: 01/08/24 1246     Updated: 01/08/24 1249    Narrative:      History: Swelling       Impression:      Impression: There is no evidence of deep venous thrombosis or  superficial thrombophlebitis of right or left lower extremities.     Comments: Bilateral lower extremity venous duplex exam was performed  using color Doppler flow, Doppler waveform analysis, and grayscale  imaging, with and without  compression. There is no evidence of deep  venous thrombosis in the common femoral, superficial femoral, popliteal,  peroneal, anterior tibial, and posterior tibial veins bilaterally. No  thrombus is identified in the saphenofemoral junctions and greater  saphenous veins bilaterally.            This report was signed and finalized on 1/8/2024 12:46 PM by Dr. Steve Mauricio MD.       CT Angiogram Chest [[id number]] Collected: 01/07/24 1001     Updated: 01/07/24 1008    Narrative:      CT ANGIOGRAM CHEST- 1/7/2024 8:37 AM     HISTORY: Tachycardic; 3 months postpartum-breast-feeding. Fever and  chills. Left lower extremity pain., HISTORY of lower extremity DVT.     COMPARISON: None     TOTAL DOSE LENGTH PRODUCT: 233.21 mGy.cm. Automated exposure control was  also utilized to decrease patient radiation dose.     TECHNIQUE: Axial images of the chest are performed following IV  contrast. 2D, 3D, and MIPS reconstructed images are reviewed.     FINDINGS: No pulmonary emboli. No thoracic aortic aneurysm or  dissection. Heart is borderline prominent in size. Trace pericardial  fluid. Trace right pleural effusion considered. No pathologic  intrathoracic or axillary lymphadenopathy. Dense breast tissue related  to lactation.     Images of the upper abdomen demonstrate no adrenal nodules. Probable  hepatic steatosis.     Calcified right lower lobe granuloma. Mild dependent basilar  atelectasis. No consolidation. No edema. No suspicious pulmonary nodule.  No discrete endobronchial lesion.     No focal aggressive regional bony lesions.       Impression:      1. No pulmonary emboli.  2. No thoracic aortic aneurysm or dissection. Borderline cardiomegaly.  Trace pericardial and right pleural fluid.  3. No parenchymal consolidation or suspicious nodularity. Calcified  right lower lobe granuloma.     This report was signed and finalized on 1/7/2024 10:05 AM by Dr. Dominique White MD.             LAB RESULTS:      Lab 01/09/24  0420  01/08/24  0425 01/07/24  0857   WBC 7.58 9.85 20.12*   HEMOGLOBIN 11.6* 11.1* 12.3   HEMATOCRIT 35.8 34.1 37.1   PLATELETS 198 212 290   NEUTROS ABS 6.07 8.73* 18.47*   IMMATURE GRANS (ABS) 0.03 0.03 0.08*   LYMPHS ABS 1.00 0.79 0.83   MONOS ABS 0.44 0.28 0.70   EOS ABS 0.02 0.00 0.01   MCV 83.3 83.8 81.2   CRP  --   --  12.27*   PROCALCITONIN  --   --  1.62*   LACTATE  --   --  1.3         Lab 01/09/24  0420 01/08/24 0425 01/07/24  0857   SODIUM 139 138 136   POTASSIUM 3.7 4.0 3.1*   CHLORIDE 106 108* 100   CO2 23.0 21.0* 21.0*   ANION GAP 10.0 9.0 15.0   BUN 7 10 17   CREATININE 0.60 0.69 0.92   EGFR 123.2 119.2 85.5   GLUCOSE 95 107* 151*   CALCIUM 8.6 8.4* 8.8   MAGNESIUM  --  2.0 1.5*   HEMOGLOBIN A1C  --  5.30  --    TSH  --  1.460  --          Lab 01/09/24 0420 01/08/24  0425 01/07/24  0857   TOTAL PROTEIN 6.3 5.9* 7.6   ALBUMIN 3.5 3.5 4.6   GLOBULIN 2.8 2.4 3.0   ALT (SGPT) 21 16 19   AST (SGOT) 17 14 13   BILIRUBIN 0.4 0.6 0.5   ALK PHOS 82 76 95             Lab 01/08/24  0425   CHOLESTEROL 123   LDL CHOL 64   HDL CHOL 33*   TRIGLYCERIDES 147             Brief Urine Lab Results  (Last result in the past 365 days)        Color   Clarity   Blood   Leuk Est   Nitrite   Protein   CREAT   Urine HCG        01/07/24 0907 Yellow   Clear   Negative   Negative   Negative   30 mg/dL (1+)                 Microbiology Results (last 10 days)       Procedure Component Value - Date/Time    MRSA Screen, PCR (Inpatient) - Swab, Nares [[id number]]  (Normal) Collected: 01/07/24 2143    Lab Status: Final result Specimen: Swab from Nares Updated: 01/07/24 2303     MRSA PCR No MRSA Detected    Narrative:      The negative predictive value of this diagnostic test is high and should only be used to consider de-escalating anti-MRSA therapy. A positive result may indicate colonization with MRSA and must be correlated clinically.    Blood Culture - Blood, Hand, Left [[id number]]  (Normal) Collected: 01/07/24 0913    Lab Status:  Preliminary result Specimen: Blood from Hand, Left Updated: 01/10/24 0930     Blood Culture No growth at 3 days    COVID PRE-OP / PRE-PROCEDURE SCREENING ORDER (NO ISOLATION) - Swab, Nasopharynx [[id number]]  (Normal) Collected: 01/07/24 0908    Lab Status: Final result Specimen: Swab from Nasopharynx Updated: 01/07/24 1005    Narrative:      The following orders were created for panel order COVID PRE-OP / PRE-PROCEDURE SCREENING ORDER (NO ISOLATION) - Swab, Nasopharynx.  Procedure                               Abnormality         Status                     ---------                               -----------         ------                     COVID-19, FLU A/B, RSV P...[[id number]]  Normal              Final result                 Please view results for these tests on the individual orders.    COVID-19, FLU A/B, RSV PCR 1 HR TAT - Swab, Nasopharynx [[id number]]  (Normal) Collected: 01/07/24 0908    Lab Status: Final result Specimen: Swab from Nasopharynx Updated: 01/07/24 1005     COVID19 Not Detected     Influenza A PCR Not Detected     Influenza B PCR Not Detected     RSV, PCR Not Detected    Narrative:      Fact sheet for providers: https://www.fda.gov/media/279873/download    Fact sheet for patients: https://www.fda.gov/media/999245/download    Test performed by PCR.    Blood Culture - Blood, Arm, Left [[id number]]  (Normal) Collected: 01/07/24 0857    Lab Status: Preliminary result Specimen: Blood from Arm, Left Updated: 01/10/24 0915     Blood Culture No growth at 3 days            Hospital Course:   Patient is a 33-year-old presented to the ER complaining of fevers and left legs red and swollen and inflamed.  Patient has a history of DVT and cellulitis in 2021.  Patient gave birth 3 months ago and is currently breast-feeding.  Patient stopped Lovenox about 6 weeks ago.  Patient denies any shortness of breath.   1/9/24  Appreciate ID, Suggest antibiotic treatment with ampicillin  Elevate her leg while she is in  "bed  Suspect she will have ongoing improvement  Likely transition to oral antibiotic treatment in the next 24 to 48 hours, remains afebrile white count unremarkable  Anticipate transitioning to oral amoxicillin at discharge   1/8/24  Patient is extremely anxious and in tears had her left lower extremity cellulitis marked off and she is thinking is getting worse continues to have fever 103 heart rate 120 sinus tachycardia remains on Vanco and Zosyn I gave the family reassurance as her WBC is unremarkable now her UA unremarkable chest x-ray has been unremarkable blood cultures remains unremarkable however we will consult ID to be on the safe side apparently 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      Nose: Nose normal.   Eyes:      Extraocular Movements: Extraocular movements intact.      Pupils: Pupils are equal, round, and reactive to light.   Cardiovascular:      Rate and Rhythm: Normal rate and regular rhythm.   Pulmonary:      Effort: Respiratory distress present.      Breath sounds: Wheezing present.   Musculoskeletal:         General: Swelling and signs of injury present.      Cervical back: Normal range of motion.      Left lower leg: Edema present.   Skin:     General: Skin is warm.   Neurological:      Mental Status: She is alert.           Condition on Discharge: Stable    Discharge Disposition:  Home or Self Care    Discharge Medications:     Discharge Medications        New Medications        Instructions Start Date "   ampicillin 500 MG capsule  Commonly known as: PRINCIPEN   500 mg, Oral, 4 Times Daily             Continue These Medications        Instructions Start Date   cholecalciferol 25 MCG (1000 UT) tablet  Commonly known as: VITAMIN D3   2,000 Units, Oral, Daily      magnesium gluconate 250 MG tablet tablet   500 mg, Oral, Daily      norethindrone 0.35 MG tablet  Commonly known as: MICRONOR   1 tablet, Oral, Daily      PRENATAL VITAMIN PO   1 tablet, Oral, Daily                   Discharge Diet: American Heart Association    Activity at Discharge: As tolerated elevate left lower extremity and keep Ace wraps on    Follow-up Appointments:   No future appointments.    Test Results Pending at Discharge: none    Electronically signed by Chary Perdue MD, 01/10/24, 11:10 CST.    Time: 35 minutes.         Electronically signed by Chary Perdue MD at 01/10/24 1113       Discharge Order (From admission, onward)       Start     Ordered    01/10/24 1109  Discharge patient  Once        Expected Discharge Date: 01/10/24   Discharge Disposition: Home or Self Care   Physician of Record for Attribution - Please select from Treatment Team: CHARY PERDUE [[id number]]   Review needed by CMO to determine Physician of Record: No      Question Answer Comment   Physician of Record for Attribution - Please select from Treatment Team CHARY PERDUE    Review needed by CMO to determine Physician of Record No        01/10/24 1105

## 2024-01-12 DIAGNOSIS — L03.116 CELLULITIS OF LEFT LOWER EXTREMITY: Primary | ICD-10-CM

## 2024-01-12 LAB
BACTERIA SPEC AEROBE CULT: NORMAL
BACTERIA SPEC AEROBE CULT: NORMAL

## 2024-01-19 NOTE — PAYOR COMM NOTE
"DC HOME 1-10-24   STILL NEED APPROVAL FOR 1/9  [id number]    [phone]    Abelino Snow (33 y.o. Female)       Date of Birth   1990    Social Security Number       Address   [place] [address]    Home Phone   [phone]    MRN   [id number]       Hinduism   Hindu    Marital Status                               Admission Date   1/7/24    Admission Type   Emergency    Admitting Provider   Maurilio Foster MD    Attending Provider       Department, Room/Bed   [facility] 3C, 396/1       Discharge Date   1/10/2024    Discharge Disposition   Home or Self Care    Discharge Destination                                 Attending Provider: (none)   Allergies: No Known Allergies    Isolation: None   Infection: None   Code Status: Prior    Ht: 157.5 cm (62\")   Wt: 99.8 kg (220 lb)    Admission Cmt: None   Principal Problem: Cellulitis [L03.90]                   Active Insurance as of 1/7/2024       Primary Coverage       Payor Plan Insurance Group Employer/Plan Group    [facility] BLUE CROSS [employer] EMPLOYEE [id number]       Payor Plan Address Payor Plan Phone Number Payor Plan Fax Number Effective Dates    [address] [phone]  1/1/2022 - None Entered    [place] [address]         Subscriber Name Subscriber Birth Date Member ID       ABELINO SNOW 1990 [id number]                     Emergency Contacts        (Rel.) Home Phone Work Phone Mobile Phone    Conner Snow (Spouse) [phone] -- --    Kira Mcgill (Mother) [phone] -- --                 Physician Progress Notes (all)        Zay Flores MD at 01/10/24 1328          Infectious Diseases Progress Note    Patient:  Abelino Gómez  YOB: 1990  MRN: [id number]   Admit date: 1/7/2024   Admitting Physician: Maurilio Foster MD  Primary Care Physician: Karen Howard APRN    Chief Complaint/Interval History: She seems to be doing well.  Discomfort " "involving the left lower extremity has shown improvement.  She still has some erythema, but the intensity is faded.  She is without fever.  She had temperature to 103 on January 8 when she was admitted.  She subsequently has been afebrile.  She does note some soreness when she puts the leg dependent, but overall discomfort has shown improvement.  Discussed with pharmacy.  Reviewed options to continue oral antibiotic treatment post discharge.  From breast-feeding standpoint they felt ampicillin would be the most appropriate-they reviewed first generation cephalosporin, penicillin VK, and amoxicillin.    Intake/Output Summary (Last 24 hours) at 1/10/2024 1328  Last data filed at 1/10/2024 1015  Gross per 24 hour   Intake 580 ml   Output --   Net 580 ml     Allergies: No Known Allergies  Current Scheduled Medications:   ampicillin, 2 g, Intravenous, Q4H  enoxaparin, 40 mg, Subcutaneous, Q24H  senna-docusate sodium, 2 tablet, Oral, BID  sodium chloride, 10 mL, Intravenous, Q12H      Current PRN Medications:    acetaminophen    senna-docusate sodium **AND** polyethylene glycol **AND** bisacodyl **AND** bisacodyl    HYDROcodone-acetaminophen    ibuprofen    ondansetron    [COMPLETED] Insert Peripheral IV **AND** sodium chloride    sodium chloride    sodium chloride    Review of Systems see HPI    Vital Signs:  /75 (BP Location: Right arm, Patient Position: Lying)   Pulse 96   Temp 98.3 °F (36.8 °C) (Oral)   Resp 16   Ht 157.5 cm (62\")   Wt 99.8 kg (220 lb)   LMP 11/15/2023 (Approximate)   SpO2 99%   Breastfeeding Yes   BMI 40.24 kg/m²     Physical Exam  Vital signs - reviewed.  Line/IV site - No erythema, warmth, induration, or tenderness.  Left lower extremity with improving erythema  More wrinkles are visible in the involved area of skin consistent with some improving edema  She has some edema left lower extremity greater than right but this seems slightly better than previous  Skin without rash    Lab " Results:  CBC:   Results from last 7 days   Lab Units 01/09/24  0420 01/08/24  0425 01/07/24  0857   WBC 10*3/mm3 7.58 9.85 20.12*   HEMOGLOBIN g/dL 11.6* 11.1* 12.3   HEMATOCRIT % 35.8 34.1 37.1   PLATELETS 10*3/mm3 198 212 290     BMP:  Results from last 7 days   Lab Units 01/09/24  0420 01/08/24  0425 01/07/24  0857   SODIUM mmol/L 139 138 136   POTASSIUM mmol/L 3.7 4.0 3.1*   CHLORIDE mmol/L 106 108* 100   CO2 mmol/L 23.0 21.0* 21.0*   BUN mg/dL 7 10 17   CREATININE mg/dL 0.60 0.69 0.92   GLUCOSE mg/dL 95 107* 151*   CALCIUM mg/dL 8.6 8.4* 8.8   ALT (SGPT) U/L 21 16 19     Culture Results:   Blood Culture   Date Value Ref Range Status   01/07/2024 No growth at 3 days  Preliminary   01/07/2024 No growth at 3 days  Preliminary     Radiology: None    Additional Studies Reviewed: None    Impression:   Left lower extremity cellulitis consistent with streptococcal cellulitis-improving  3 months postpartum  She is continuing to breast-feed    Recommendations:   Okay with me for her to be discharged home today following her dose of ceftriaxone  Would suggest oral antibiotic treatment with ampicillin 500 mg orally 4 times a day for 7 days at discharge  Follow-up appointment in 10 to 14 days    Zay Flores MD    Electronically signed by Zay Flores MD at 01/10/24 1341       Maurilio Foster MD at 01/09/24 0945              [facility]  INPATIENT PROGRESS NOTE    Patient Name: Waleska Gómez  Date of Admission: 1/7/2024  Today's Date: 01/09/24  Length of Stay: 2  Primary Care Physician: Karen Howard APRN    Subjective   Chief Complaint: left leg cellulitis   Fever     Leg Pain          Patient is a 33-year-old presented to the ER complaining of fevers and left legs red and swollen and inflamed.  Patient has a history of DVT and cellulitis in 2021.  Patient gave birth 3 months ago and is currently breast-feeding.  Patient stopped Lovenox about 6 weeks ago.   Patient denies any shortness of breath.   1/9/24  Appreciate ID, Suggest antibiotic treatment with ampicillin  Elevate her leg while she is in bed  Suspect she will have ongoing improvement  Likely transition to oral antibiotic treatment in the next 24 to 48 hours, remains afebrile white count unremarkable  Anticipate transitioning to oral amoxicillin at discharge   1/8/24  Patient is extremely anxious and in tears had her left lower extremity cellulitis marked off and she is thinking is getting worse continues to have fever 103 heart rate 120 sinus tachycardia remains on Vanco and Zosyn I gave the family reassurance as her WBC is unremarkable now her UA unremarkable chest x-ray has been unremarkable blood cultures remains unremarkable however we will consult ID to be on the safe side apparently 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 and she has been vulnerable for recurrent cellulitis since then I instructed nursing staff and the patient to elevate the leg and use Ace wraps      Review of Systems   Constitutional:  Positive for fever.      All pertinent negatives and positives are as above. All other systems have been reviewed and are negative unless otherwise stated.     Objective    Temp:  [98.5 °F (36.9 °C)-103.1 °F (39.5 °C)] 98.5 °F (36.9 °C)  Heart Rate:  [] 101  Resp:  [18-20] 18  BP: (100-149)/(46-78) 100/56  Physical Exam  Constitutional:       Appearance: She is ill-appearing.   HENT:      Head: Normocephalic and atraumatic.      Nose: Nose normal.   Eyes:      Pupils: Pupils are equal, round, and reactive to light.   Cardiovascular:      Rate and Rhythm: Normal rate and regular rhythm. Tachycardia present.      Pulses: Normal pulses.   Pulmonary:      Effort: Pulmonary effort is normal.      Breath sounds: Normal breath sounds.   Abdominal:      General: Abdomen is flat.      Palpations: Abdomen is soft.   Musculoskeletal:         General: Swelling present.      Cervical back:  "Normal range of motion.      Left lower leg: Edema present.   Skin:     Capillary Refill: Capillary refill takes less than 2 seconds.      Findings: Erythema and rash present.             Results Review:  I have reviewed the labs, radiology results, and diagnostic studies.    Laboratory Data:   Results from last 7 days   Lab Units 01/09/24  0420 01/08/24  0425 01/07/24  0857   WBC 10*3/mm3 7.58 9.85 20.12*   HEMOGLOBIN g/dL 11.6* 11.1* 12.3   HEMATOCRIT % 35.8 34.1 37.1   PLATELETS 10*3/mm3 198 212 290        Results from last 7 days   Lab Units 01/09/24  0420 01/08/24  0425 01/07/24  0857   SODIUM mmol/L 139 138 136   POTASSIUM mmol/L 3.7 4.0 3.1*   CHLORIDE mmol/L 106 108* 100   CO2 mmol/L 23.0 21.0* 21.0*   BUN mg/dL 7 10 17   CREATININE mg/dL 0.60 0.69 0.92   CALCIUM mg/dL 8.6 8.4* 8.8   BILIRUBIN mg/dL 0.4 0.6 0.5   ALK PHOS U/L 82 76 95   ALT (SGPT) U/L 21 16 19   AST (SGOT) U/L 17 14 13   GLUCOSE mg/dL 95 107* 151*       Culture Data:   No results found for: \"BLOODCX\", \"URINECX\", \"WOUNDCX\", \"MRSACX\", \"RESPCX\", \"STOOLCX\"    Radiology Data:   Imaging Results (Last 24 Hours)       Procedure Component Value Units Date/Time    US Venous Doppler Lower Extremity Bilateral (duplex) [[id number]] Collected: 01/08/24 1246     Updated: 01/08/24 1249    Narrative:      History: Swelling       Impression:      Impression: There is no evidence of deep venous thrombosis or  superficial thrombophlebitis of right or left lower extremities.     Comments: Bilateral lower extremity venous duplex exam was performed  using color Doppler flow, Doppler waveform analysis, and grayscale  imaging, with and without compression. There is no evidence of deep  venous thrombosis in the common femoral, superficial femoral, popliteal,  peroneal, anterior tibial, and posterior tibial veins bilaterally. No  thrombus is identified in the saphenofemoral junctions and greater  saphenous veins bilaterally.            This report was signed and " finalized on 1/8/2024 12:46 PM by Dr. Steve Mauricio MD.               I have reviewed the patient's current medications.     Assessment/Plan   Assessment  Active Hospital Problems    Diagnosis     **Cellulitis        Treatment Plan  Cellulitis of left leg.  Elevated C-reactive protein.  Elevated procalcitonin.  White blood cells 20,000.  Patient was given Zosyn in ER.  Norco as needed.  Tylenol as needed.     History of DVT, start Lovenox 6 weeks ago.  IV hydration.  Doppler ultrasound lower extremities preliminary-BLE: No thrombus vis.   CTA scan of the chest-No pulmonary emboli, No thoracic aortic aneurysm or dissection, Borderline cardiomegaly, trace pericardial and right pleural fluid, No parenchymal consolidation or suspicious nodularity, Calcified right lower lobe granuloma.     Lovenox prophylaxis.     Hypokalemia.  P.o. potassium.     Hypomagnesia.  2 g magnesium IV ordered in ER.  Magnesium in AM.     Nutrition .  Regular diet     Blood culture pending.  COVID-19-negative  1/8/24  Remains on Zosyn and vancomycin spiked a fever of 103 COVID flu were unremarkable UA unremarkable white count has normalized Patient is extremely anxious and in tears had her left lower extremity cellulitis marked off and she is thinking is getting worse continues to have fever 103 heart rate 120 sinus tachycardia remains on Vanco and Zosyn I gave the family reassurance as her WBC is unremarkable now her UA unremarkable chest x-ray has been unremarkable blood cultures remains unremarkable however we will consult ID to be on the safe side apparently 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 and she has been vulnerable for recurrent cellulitis since then I instructed nursing staff and the patient to elevate the leg and use Ace wraps   1/9/24  Appreciate ID, Suggest antibiotic treatment with ampicillin  Elevate her leg while she is in bed  Suspect she will have ongoing improvement  Likely transition to oral  antibiotic treatment in the next 24 to 48 hours, remains afebrile white count unremarkable  Anticipate transitioning to oral amoxicillin at discharge   Medical Decision Making  Number and Complexity of problems: Cellulitis left lower extremity/history of DVT/hypokalemia/hypomagnesium  Differential Diagnosis: None     Conditions and Status        Condition is unchanged.     [facility] Data  External documents reviewed: ER notes .  Cardiac tracing (EKG, telemetry) interpretation: Tacky .  Radiology interpretation: CT scan/Doppler  Labs reviewed: Laboratory  Any tests that were considered but not ordered: Laboratory in AM     Decision rules/scores evaluated (example [id number], Wells, etc): None     Discussed with: Patient and      Care Planning  Shared decision making: Patient and   Code status and discussions: Full code     Disposition  Social Determinants of Health that impact treatment or disposition: From home  Estimated length of stay is 2 to 4 days.          Electronically signed by Maurilio Foster MD, 01/09/24, 09:45 CST.    Electronically signed by Maurilio Foster MD at 01/09/24 1237       Maurilio Foster MD at 01/08/24 0849              [facility]  INPATIENT PROGRESS NOTE    Patient Name: Waleska Gómez  Date of Admission: 1/7/2024  Today's Date: 01/08/24  Length of Stay: 1  Primary Care Physician: Karen Howard APRN    Subjective   Chief Complaint: left leg cellulitis   HPI     Patient is a 33-year-old presented to the ER complaining of fevers and left legs red and swollen and inflamed.  Patient has a history of DVT and cellulitis in 2021.  Patient gave birth 3 months ago and is currently breast-feeding.  Patient stopped Lovenox about 6 weeks ago.  Patient denies any shortness of breath.   1/8/24  Patient is extremely anxious and in tears had her left lower extremity cellulitis marked off and she is thinking is getting worse continues to have  fever 103 heart rate 120 sinus tachycardia remains on Vanco and Zosyn I gave the family reassurance as her WBC is unremarkable now her UA unremarkable chest x-ray has been unremarkable blood cultures remains unremarkable however we will consult ID to be on the safe side apparently 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 and she has been vulnerable for recurrent cellulitis since then I instructed nursing staff and the patient to elevate the leg and use Ace wraps      Review of Systems   All pertinent negatives and positives are as above. All other systems have been reviewed and are negative unless otherwise stated.     Objective    Temp:  [98.2 °F (36.8 °C)-103.4 °F (39.7 °C)] 103.1 °F (39.5 °C)  Heart Rate:  [] 177  Resp:  [18-20] 20  BP: ()/(49-70) 101/66  Physical Exam  Constitutional:       Appearance: She is ill-appearing.   HENT:      Head: Normocephalic and atraumatic.      Nose: Nose normal.   Eyes:      Pupils: Pupils are equal, round, and reactive to light.   Cardiovascular:      Rate and Rhythm: Normal rate and regular rhythm. Tachycardia present.      Pulses: Normal pulses.   Pulmonary:      Effort: Pulmonary effort is normal.      Breath sounds: Normal breath sounds.   Abdominal:      General: Abdomen is flat.      Palpations: Abdomen is soft.   Musculoskeletal:         General: Swelling present.      Cervical back: Normal range of motion.      Left lower leg: Edema present.   Skin:     Capillary Refill: Capillary refill takes less than 2 seconds.      Findings: Erythema and rash present.             Results Review:  I have reviewed the labs, radiology results, and diagnostic studies.    Laboratory Data:   Results from last 7 days   Lab Units 01/08/24  0425 01/07/24  0857   WBC 10*3/mm3 9.85 20.12*   HEMOGLOBIN g/dL 11.1* 12.3   HEMATOCRIT % 34.1 37.1   PLATELETS 10*3/mm3 212 290        Results from last 7 days   Lab Units 01/08/24  0425 01/07/24  0857   SODIUM mmol/L 138 136  "  POTASSIUM mmol/L 4.0 3.1*   CHLORIDE mmol/L 108* 100   CO2 mmol/L 21.0* 21.0*   BUN mg/dL 10 17   CREATININE mg/dL 0.69 0.92   CALCIUM mg/dL 8.4* 8.8   BILIRUBIN mg/dL 0.6 0.5   ALK PHOS U/L 76 95   ALT (SGPT) U/L 16 19   AST (SGOT) U/L 14 13   GLUCOSE mg/dL 107* 151*       Culture Data:   No results found for: \"BLOODCX\", \"URINECX\", \"WOUNDCX\", \"MRSACX\", \"RESPCX\", \"STOOLCX\"    Radiology Data:   Imaging Results (Last 24 Hours)       Procedure Component Value Units Date/Time    US Venous Doppler Lower Extremity Bilateral (duplex) [[id number]] Resulted: 01/07/24 1122     Updated: 01/07/24 1125    CT Angiogram Chest [[id number]] Collected: 01/07/24 1001     Updated: 01/07/24 1008    Narrative:      CT ANGIOGRAM CHEST- 1/7/2024 8:37 AM     HISTORY: Tachycardic; 3 months postpartum-breast-feeding. Fever and  chills. Left lower extremity pain., HISTORY of lower extremity DVT.     COMPARISON: None     TOTAL DOSE LENGTH PRODUCT: 233.21 mGy.cm. Automated exposure control was  also utilized to decrease patient radiation dose.     TECHNIQUE: Axial images of the chest are performed following IV  contrast. 2D, 3D, and MIPS reconstructed images are reviewed.     FINDINGS: No pulmonary emboli. No thoracic aortic aneurysm or  dissection. Heart is borderline prominent in size. Trace pericardial  fluid. Trace right pleural effusion considered. No pathologic  intrathoracic or axillary lymphadenopathy. Dense breast tissue related  to lactation.     Images of the upper abdomen demonstrate no adrenal nodules. Probable  hepatic steatosis.     Calcified right lower lobe granuloma. Mild dependent basilar  atelectasis. No consolidation. No edema. No suspicious pulmonary nodule.  No discrete endobronchial lesion.     No focal aggressive regional bony lesions.       Impression:      1. No pulmonary emboli.  2. No thoracic aortic aneurysm or dissection. Borderline cardiomegaly.  Trace pericardial and right pleural fluid.  3. No parenchymal " consolidation or suspicious nodularity. Calcified  right lower lobe granuloma.     This report was signed and finalized on 1/7/2024 10:05 AM by Dr. Dominique White MD.               I have reviewed the patient's current medications.     Assessment/Plan   Assessment  Active Hospital Problems    Diagnosis     **Cellulitis        Treatment Plan  Cellulitis of left leg.  Elevated C-reactive protein.  Elevated procalcitonin.  White blood cells 20,000.  Patient was given Zosyn in ER.  Norco as needed.  Tylenol as needed.     History of DVT, start Lovenox 6 weeks ago.  IV hydration.  Doppler ultrasound lower extremities preliminary-BLE: No thrombus vis.   CTA scan of the chest-No pulmonary emboli, No thoracic aortic aneurysm or dissection, Borderline cardiomegaly, trace pericardial and right pleural fluid, No parenchymal consolidation or suspicious nodularity, Calcified right lower lobe granuloma.     Lovenox prophylaxis.     Hypokalemia.  P.o. potassium.     Hypomagnesia.  2 g magnesium IV ordered in ER.  Magnesium in AM.     Nutrition .  Regular diet     Blood culture pending.  COVID-19-negative  1/8/24  Remains on Zosyn and vancomycin spiked a fever of 103 COVID flu were unremarkable UA unremarkable white count has normalized Patient is extremely anxious and in tears had her left lower extremity cellulitis marked off and she is thinking is getting worse continues to have fever 103 heart rate 120 sinus tachycardia remains on Vanco and Zosyn I gave the family reassurance as her WBC is unremarkable now her UA unremarkable chest x-ray has been unremarkable blood cultures remains unremarkable however we will consult ID to be on the safe side apparently 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 and she has been vulnerable for recurrent cellulitis since then I instructed nursing staff and the patient to elevate the leg and use Ace wraps     Medical Decision Making  Number and Complexity of problems: Cellulitis  "left lower extremity/history of DVT/hypokalemia/hypomagnesium  Differential Diagnosis: None     Conditions and Status        Condition is unchanged.     [facility] Data  External documents reviewed: ER notes .  Cardiac tracing (EKG, telemetry) interpretation: Tacky .  Radiology interpretation: CT scan/Doppler  Labs reviewed: Laboratory  Any tests that were considered but not ordered: Laboratory in AM     Decision rules/scores evaluated (example [id number], Wells, etc): None     Discussed with: Patient and      Care Planning  Shared decision making: Patient and   Code status and discussions: Full code     Disposition  Social Determinants of Health that impact treatment or disposition: From home  Estimated length of stay is 2 to 4 days.          Electronically signed by Maurilio Foster MD, 01/08/24, 08:50 CST.    Electronically signed by Maurilio Foster MD at 01/08/24 1341          Consult Notes (all)        Zay Flores MD at 01/08/24 1641          INFECTIOUS DISEASES CONSULT NOTE    Patient:  Waleska Gómez 33 y.o. female  ROOM # 396/1  YOB: 1990  MRN: [id number]  CSN:  [id number]  Admit date: 1/7/2024   Admitting Physician: Maurilio Foster MD  Primary Care Physician: Karen Howard APRN  REFERRING PROVIDER: Provider, No Known    Reason for Consultation: \"Cellulitis with SIRS, family request\" well    History of Present Illness/Chief Complaint: Pleasant 33-year-old woman.  She indicates Saturday evening she developed a temperature to 104.  She developed some redness and swelling involving the left lower extremity.  She has had a previous history of cellulitis involving the left lower extremity.  She has had previous melanoma removed from her left leg with lymph node dissection.  She has also had a previous left lower extremity DVT.  She indicates she has a 3-month-old child and is currently breast-feeding.  She is feeling better than on admission.  Infectious disease asked to evaluate " "and offer recommendations.    Current Scheduled Medications:   enoxaparin, 40 mg, Subcutaneous, Q24H  piperacillin-tazobactam, 3.375 g, Intravenous, Q8H  senna-docusate sodium, 2 tablet, Oral, BID  sodium chloride, 10 mL, Intravenous, Q12H  vancomycin, 1,250 mg, Intravenous, Q12H    Current PRN Medications:    acetaminophen    senna-docusate sodium **AND** polyethylene glycol **AND** bisacodyl **AND** bisacodyl    HYDROcodone-acetaminophen    ibuprofen    ondansetron    [COMPLETED] Insert Peripheral IV **AND** sodium chloride    sodium chloride    sodium chloride    Allergies:  No Known Allergies    Past Medical History: DVT.  Melanoma.  She is 3 months postpartum.  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    Past Surgical History: Appendectomy.  Exploratory laparotomy.  Femoral lymph node biopsy.  Tonsillectomy.  [place] tooth extraction.    Social History: Reports no heavy alcohol use.    Family History: Noncontributory    Exposure History: No close contacts have been ill    Review of Systems no urinary symptoms.  No diarrhea.  No rash.  No nausea or vomiting.  Lungs clear to auscultation without crackles  Heart regular rhythm without murmur  Abdomen soft and nontender    Vital Signs:  /52 (BP Location: Right arm, Patient Position: Lying)   Pulse 110   Temp 98.7 °F (37.1 °C)   Resp 20   Ht 157.5 cm (62\")   Wt 97.5 kg (215 lb)   LMP 11/15/2023 (Approximate)   SpO2 97%   Breastfeeding Yes   BMI 39.32 kg/m²  Temp (24hrs), Av.8 °F (38.2 °C), Min:98.2 °F (36.8 °C), Max:103.4 °F (39.7 °C)    Physical Exam  Vital signs - reviewed.  Line/IV site - No erythema or tenderness.  Left lower extremity with erythema shin area extending to distal medial thigh consistent with cellulitis.  Distal sensation and pulses intact  Abdomen is soft and nontender  Lungs without crackles  Heart regular rhythm without murmur    Lab Results:  CBC:   Results " from last 7 days   Lab Units 01/08/24  0425 01/07/24  0857   WBC 10*3/mm3 9.85 20.12*   HEMOGLOBIN g/dL 11.1* 12.3   HEMATOCRIT % 34.1 37.1   PLATELETS 10*3/mm3 212 290     CMP:   Results from last 7 days   Lab Units 01/08/24  0425 01/07/24  0857   SODIUM mmol/L 138 136   POTASSIUM mmol/L 4.0 3.1*   CHLORIDE mmol/L 108* 100   CO2 mmol/L 21.0* 21.0*   BUN mg/dL 10 17   CREATININE mg/dL 0.69 0.92   CALCIUM mg/dL 8.4* 8.8   BILIRUBIN mg/dL 0.6 0.5   ALK PHOS U/L 76 95   ALT (SGPT) U/L 16 19   AST (SGOT) U/L 14 13   GLUCOSE mg/dL 107* 151*     Radiology:   CT angiogram of the chest completed January 7, 2024:  IMPRESSION:  1. No pulmonary emboli.  2. No thoracic aortic aneurysm or dissection. Borderline cardiomegaly.  Trace pericardial and right pleural fluid.  3. No parenchymal consolidation or suspicious nodularity. Calcified  right lower lobe granuloma.    Lower extremity venous ultrasound:  IMPRESSION:  Impression: There is no evidence of deep venous thrombosis or  superficial thrombophlebitis of right or left lower extremities.    Additional Studies Reviewed:     Impression:   1.  Left lower extremity cellulitis.  Findings consistent with streptococcal cellulitis.  She has risk factors for streptococcal cellulitis including a previous episode of cellulitis, previous lymph node removal left lower extremity, previous surgery from melanoma resection left lower extremity, and some mild left lower extremity swelling.  2.  3 months postpartum  3.  Currently breast-feeding    Recommendations:    Suggest antibiotic treatment with ampicillin  Elevate her leg while she is in bed  Suspect she will have ongoing improvement  Likely transition to oral antibiotic treatment in the next 24 to 48 hours  Anticipate transitioning to oral amoxicillin at discharge  Continue to follow    Zay Gallagher MD  01/08/24  16:41 CST            Electronically signed by Zay Gallagher MD at 01/08/24 4519          Discharge Summary        Kristin  MD Maurilio at 01/10/24 1110                [facility] Medicine Services  DISCHARGE SUMMARY       Date of Admission: 1/7/2024  Date of Discharge:  1/10/2024  Primary Care Physician: Karen Howard APRN    Presenting Problem/History of Present Illness:  Patient is a 33-year-old presented to the ER complaining of fevers and left legs red and swollen and inflamed.  Patient has a history of DVT and cellulitis in 2021.  Patient gave birth 3 months ago and is currently breast-feeding.  Patient stopped Lovenox about 6 weeks ago.  Patient denies any shortness of breath.   1/9/24  Appreciate ID, Suggest antibiotic treatment with ampicillin  Elevate her leg while she is in bed  Suspect she will have ongoing improvement  Likely transition to oral antibiotic treatment in the next 24 to 48 hours, remains afebrile white count unremarkable  Anticipate transitioning to oral amoxicillin at discharge   1/8/24  Patient is extremely anxious and in tears had her left lower extremity cellulitis marked off and she is thinking is getting worse continues to have fever 103 heart rate 120 sinus tachycardia remains on Vanco and Zosyn I gave the family reassurance as her WBC is unremarkable now her UA unremarkable chest x-ray has been unremarkable blood cultures remains unremarkable however we will consult ID to be on the safe side apparently 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 and she has been vulnerable for recurrent cellulitis since then I instructed nursing staff and the patient to elevate the leg and use Ace wraps    Final Discharge Diagnoses:  Active Hospital Problems    Diagnosis     **Cellulitis        Consults: Infectious disease    Procedures Performed: None    Pertinent Test Results:       Imaging Results (All)       Procedure Component Value Units Date/Time    US Venous Doppler Lower Extremity Bilateral (duplex) [[id number]] Collected: 01/08/24 1246     Updated: 01/08/24 1249     Narrative:      History: Swelling       Impression:      Impression: There is no evidence of deep venous thrombosis or  superficial thrombophlebitis of right or left lower extremities.     Comments: Bilateral lower extremity venous duplex exam was performed  using color Doppler flow, Doppler waveform analysis, and grayscale  imaging, with and without compression. There is no evidence of deep  venous thrombosis in the common femoral, superficial femoral, popliteal,  peroneal, anterior tibial, and posterior tibial veins bilaterally. No  thrombus is identified in the saphenofemoral junctions and greater  saphenous veins bilaterally.            This report was signed and finalized on 1/8/2024 12:46 PM by Dr. Steve Mauricio MD.       CT Angiogram Chest [[id number]] Collected: 01/07/24 1001     Updated: 01/07/24 1008    Narrative:      CT ANGIOGRAM CHEST- 1/7/2024 8:37 AM     HISTORY: Tachycardic; 3 months postpartum-breast-feeding. Fever and  chills. Left lower extremity pain., HISTORY of lower extremity DVT.     COMPARISON: None     TOTAL DOSE LENGTH PRODUCT: 233.21 mGy.cm. Automated exposure control was  also utilized to decrease patient radiation dose.     TECHNIQUE: Axial images of the chest are performed following IV  contrast. 2D, 3D, and MIPS reconstructed images are reviewed.     FINDINGS: No pulmonary emboli. No thoracic aortic aneurysm or  dissection. Heart is borderline prominent in size. Trace pericardial  fluid. Trace right pleural effusion considered. No pathologic  intrathoracic or axillary lymphadenopathy. Dense breast tissue related  to lactation.     Images of the upper abdomen demonstrate no adrenal nodules. Probable  hepatic steatosis.     Calcified right lower lobe granuloma. Mild dependent basilar  atelectasis. No consolidation. No edema. No suspicious pulmonary nodule.  No discrete endobronchial lesion.     No focal aggressive regional bony lesions.       Impression:      1. No pulmonary  emboli.  2. No thoracic aortic aneurysm or dissection. Borderline cardiomegaly.  Trace pericardial and right pleural fluid.  3. No parenchymal consolidation or suspicious nodularity. Calcified  right lower lobe granuloma.     This report was signed and finalized on 1/7/2024 10:05 AM by Dr. Dominique White MD.             LAB RESULTS:      Lab 01/09/24 0420 01/08/24 0425 01/07/24  0857   WBC 7.58 9.85 20.12*   HEMOGLOBIN 11.6* 11.1* 12.3   HEMATOCRIT 35.8 34.1 37.1   PLATELETS 198 212 290   NEUTROS ABS 6.07 8.73* 18.47*   IMMATURE GRANS (ABS) 0.03 0.03 0.08*   LYMPHS ABS 1.00 0.79 0.83   MONOS ABS 0.44 0.28 0.70   EOS ABS 0.02 0.00 0.01   MCV 83.3 83.8 81.2   CRP  --   --  12.27*   PROCALCITONIN  --   --  1.62*   LACTATE  --   --  1.3         Lab 01/09/24 0420 01/08/24 0425 01/07/24  0857   SODIUM 139 138 136   POTASSIUM 3.7 4.0 3.1*   CHLORIDE 106 108* 100   CO2 23.0 21.0* 21.0*   ANION GAP 10.0 9.0 15.0   BUN 7 10 17   CREATININE 0.60 0.69 0.92   EGFR 123.2 119.2 85.5   GLUCOSE 95 107* 151*   CALCIUM 8.6 8.4* 8.8   MAGNESIUM  --  2.0 1.5*   HEMOGLOBIN A1C  --  5.30  --    TSH  --  1.460  --          Lab 01/09/24 0420 01/08/24  0425 01/07/24  0857   TOTAL PROTEIN 6.3 5.9* 7.6   ALBUMIN 3.5 3.5 4.6   GLOBULIN 2.8 2.4 3.0   ALT (SGPT) 21 16 19   AST (SGOT) 17 14 13   BILIRUBIN 0.4 0.6 0.5   ALK PHOS 82 76 95             Lab 01/08/24  0425   CHOLESTEROL 123   LDL CHOL 64   HDL CHOL 33*   TRIGLYCERIDES 147             Brief Urine Lab Results  (Last result in the past 365 days)        Color   Clarity   Blood   Leuk Est   Nitrite   Protein   CREAT   Urine HCG        01/07/24 0907 Yellow   Clear   Negative   Negative   Negative   30 mg/dL (1+)                 Microbiology Results (last 10 days)       Procedure Component Value - Date/Time    MRSA Screen, PCR (Inpatient) - Swab, Nares [[id number]]  (Normal) Collected: 01/07/24 2143    Lab Status: Final result Specimen: Swab from Nares Updated: 01/07/24 3825     MRSA PCR  No MRSA Detected    Narrative:      The negative predictive value of this diagnostic test is high and should only be used to consider de-escalating anti-MRSA therapy. A positive result may indicate colonization with MRSA and must be correlated clinically.    Blood Culture - Blood, Hand, Left [[id number]]  (Normal) Collected: 01/07/24 0913    Lab Status: Preliminary result Specimen: Blood from Hand, Left Updated: 01/10/24 0930     Blood Culture No growth at 3 days    COVID PRE-OP / PRE-PROCEDURE SCREENING ORDER (NO ISOLATION) - Swab, Nasopharynx [[id number]]  (Normal) Collected: 01/07/24 0908    Lab Status: Final result Specimen: Swab from Nasopharynx Updated: 01/07/24 1005    Narrative:      The following orders were created for panel order COVID PRE-OP / PRE-PROCEDURE SCREENING ORDER (NO ISOLATION) - Swab, Nasopharynx.  Procedure                               Abnormality         Status                     ---------                               -----------         ------                     COVID-19, FLU A/B, RSV P...[[id number]]  Normal              Final result                 Please view results for these tests on the individual orders.    COVID-19, FLU A/B, RSV PCR 1 HR TAT - Swab, Nasopharynx [[id number]]  (Normal) Collected: 01/07/24 0908    Lab Status: Final result Specimen: Swab from Nasopharynx Updated: 01/07/24 1005     COVID19 Not Detected     Influenza A PCR Not Detected     Influenza B PCR Not Detected     RSV, PCR Not Detected    Narrative:      Fact sheet for providers: https://www.fda.gov/media/101847/download    Fact sheet for patients: https://www.fda.gov/media/682033/download    Test performed by PCR.    Blood Culture - Blood, Arm, Left [[id number]]  (Normal) Collected: 01/07/24 0857    Lab Status: Preliminary result Specimen: Blood from Arm, Left Updated: 01/10/24 0915     Blood Culture No growth at 3 days            Hospital Course:   Patient is a 33-year-old presented to the ER complaining of  "fevers and left legs red and swollen and inflamed.  Patient has a history of DVT and cellulitis in 2021.  Patient gave birth 3 months ago and is currently breast-feeding.  Patient stopped Lovenox about 6 weeks ago.  Patient denies any shortness of breath.   1/9/24  Appreciate ID, Suggest antibiotic treatment with ampicillin  Elevate her leg while she is in bed  Suspect she will have ongoing improvement  Likely transition to oral antibiotic treatment in the next 24 to 48 hours, remains afebrile white count unremarkable  Anticipate transitioning to oral amoxicillin at discharge   1/8/24  Patient is extremely anxious and in tears had her left lower extremity cellulitis marked off and she is thinking is getting worse continues to have fever 103 heart rate 120 sinus tachycardia remains on Vanco and Zosyn I gave the family reassurance as her WBC is unremarkable now her UA unremarkable chest x-ray has been unremarkable blood cultures remains unremarkable however we will consult ID to be on the safe side apparently 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      Nose: Nose normal.   Eyes:      Extraocular Movements: Extraocular movements intact.      Pupils: Pupils are equal, round, and reactive to light.   Cardiovascular:      Rate and Rhythm: Normal rate and regular rhythm.   Pulmonary:      Effort: Respiratory distress present.      Breath sounds: Wheezing present.   Musculoskeletal:         General: Swelling and signs of injury present.      " Cervical back: Normal range of motion.      Left lower leg: Edema present.   Skin:     General: Skin is warm.   Neurological:      Mental Status: She is alert.           Condition on Discharge: Stable    Discharge Disposition:  Home or Self Care    Discharge Medications:     Discharge Medications        New Medications        Instructions Start Date   ampicillin 500 MG capsule  Commonly known as: PRINCIPEN   500 mg, Oral, 4 Times Daily             Continue These Medications        Instructions Start Date   cholecalciferol 25 MCG (1000 UT) tablet  Commonly known as: VITAMIN D3   2,000 Units, Oral, Daily      magnesium gluconate 250 MG tablet tablet   500 mg, Oral, Daily      norethindrone 0.35 MG tablet  Commonly known as: MICRONOR   1 tablet, Oral, Daily      PRENATAL VITAMIN PO   1 tablet, Oral, Daily                   Discharge Diet: American Heart Association    Activity at Discharge: As tolerated elevate left lower extremity and keep Ace wraps on    Follow-up Appointments:   No future appointments.    Test Results Pending at Discharge: none    Electronically signed by Maurilio Foster MD, 01/10/24, 11:10 CST.    Time: 35 minutes.         Electronically signed by Maurilio Foster MD at 01/10/24 1110